# Patient Record
Sex: FEMALE | Race: WHITE
[De-identification: names, ages, dates, MRNs, and addresses within clinical notes are randomized per-mention and may not be internally consistent; named-entity substitution may affect disease eponyms.]

---

## 2019-12-19 ENCOUNTER — HOSPITAL ENCOUNTER (EMERGENCY)
Dept: HOSPITAL 75 - ER FS | Age: 64
Discharge: HOME | End: 2019-12-19
Payer: MEDICARE

## 2019-12-19 VITALS — SYSTOLIC BLOOD PRESSURE: 137 MMHG | DIASTOLIC BLOOD PRESSURE: 68 MMHG

## 2019-12-19 VITALS — HEIGHT: 60 IN | BODY MASS INDEX: 28.57 KG/M2 | WEIGHT: 145.51 LBS

## 2019-12-19 DIAGNOSIS — Z88.8: ICD-10-CM

## 2019-12-19 DIAGNOSIS — B34.9: Primary | ICD-10-CM

## 2019-12-19 DIAGNOSIS — Z88.5: ICD-10-CM

## 2019-12-19 DIAGNOSIS — M79.7: ICD-10-CM

## 2019-12-19 DIAGNOSIS — Z88.0: ICD-10-CM

## 2019-12-19 PROCEDURE — 87804 INFLUENZA ASSAY W/OPTIC: CPT

## 2019-12-19 PROCEDURE — 70450 CT HEAD/BRAIN W/O DYE: CPT

## 2019-12-19 PROCEDURE — 72125 CT NECK SPINE W/O DYE: CPT

## 2019-12-19 NOTE — ED GENERAL
General


Chief Complaint:  Cough/Cold/Flu Symptoms


Stated Complaint:  COUGH,FEVER,FALL-HIT HEAD





History of Present Illness


Date Seen by Provider:  Dec 19, 2019


Time Seen by Provider:  17:39


Initial Comments


Patient is a 64-year-old female with past medical history significant for 

fibromyalgia who comes to the ER today complaining of flu like symptoms. Her 

 has been ill in the house as well. She has had symptoms that started 

yesterday and worsened today. She complains of some upper airway congestion, p

ersistent nonproductive cough, general myalgias, and malaise. Patient states she

did fall on her bathtub last night striking the right side of her head on the 

water spigot. She did not lose consciousness. No blurred vision, nausea or 

vomiting today or since the accident. She does not complain of cervical neck 

tenderness.





Allergies and Home Medications


Allergies


Coded Allergies:  


     Penicillins (Verified  Allergy, Unknown, 12/19/19)


     escitalopram (Verified  Allergy, Unknown, 12/19/19)


     morphine (Verified  Allergy, Unknown, 12/19/19)





Home Medications


Benzonatate 100 Mg Capsule, 200 MG PO TID PRN for cough


   Prescribed by: VINCENT SPARKS on 12/19/19 1838


Prednisone 50 Mg Tab, 50 MG PO DAILY


   Prescribed by: VINCENT SPARKS on 12/19/19 1838





Patient Home Medication List


Home Medication List Reviewed:  Yes





Review of Systems


Review of Systems


Constitutional:  see HPI


EENTM:  see HPI


Respiratory:  no symptoms reported


Gastrointestinal:  no symptoms reported


Musculoskeletal:  muscle pain


Skin:  no symptoms reported


Psychiatric/Neurological:  No Symptoms Reported





All Other Systems Reviewed


Negative Unless Noted:  Yes





Past Medical-Social-Family Hx


Patient Social History


Recent Foreign Travel:  No


Contact w/Someone Who Travel:  No





Physical Exam


Vital Signs





Vital Signs - First Documented








 12/19/19





 17:20


 


Temp 36.9


 


Pulse 78


 


Resp 18


 


B/P (MAP) 137/68 (91)


 


Pulse Ox 97


 


O2 Delivery Room Air





Capillary Refill :


Height, Weight, BMI


Height: '"


Weight: lbs. oz. kg;  BMI


Method:


General Appearance:  No Apparent Distress, WD/WN


HEENT:  PERRL/EOMI, TMs Normal, Normal ENT Inspection, Pharynx Normal


Neck:  Full Range of Motion, Normal Inspection, Non Tender, Supple


Respiratory:  Chest Non Tender, Lungs Clear, Normal Breath Sounds, Other (wheezy

cough during the interview but lungs are otherwise clear with good air mvt 

bilaterally)


Cardiovascular:  Regular Rate, Rhythm, No Edema


Extremity:  Normal Capillary Refill


Neurologic/Psychiatric:  Alert, Oriented x3


Skin:  Normal Color, Warm/Dry





Progress/Results/Core Measures


Suspected Sepsis


SIRS


Temperature: 


Pulse:  


Respiratory Rate: 


 


Blood Pressure  / 


Mean:





Results/Orders


Micro Results





Microbiology


12/19/19 Influenza Types A,B Antigen (CHRISTINA) - Final, Complete


           





My Orders





Orders - VINCENT SPARKS DO


Influenza A And B Antigens (12/19/19 17:22)


Ct Head/Cervical Spine Wo (12/19/19 17:23)


Prednisone Tablet (Deltasone Tablet) (12/19/19 18:45)


Benzonatate Capsule (Tessalon Perles) (12/19/19 18:45)





Vital Signs/I&O











 12/19/19 12/19/19





 17:20 17:20


 


Temp  36.9


 


Pulse  78


 


Resp  18


 


B/P (MAP)  137/68 (91)


 


Pulse Ox  97


 


O2 Delivery Room Air Room Air





Capillary Refill :


Progress Note :  


   Time:  17:42


Progress Note


Patient is evaluated immediately on arrival to her room. She is in no distress 

and is drinking by mouth fluids. Her physical exam is largely unremarkable 

including her HEENT exam other than some ecchymosis is present behind the right 

ear near the mastoid process where the patient states she struck her head last 

evening. She is noted to have a cough during the interview that sounds very 

wheezy but no adventitious lung sounds are present and she has good air movement

in all fields. She sustained a fall last night in her bathtub. She does complain

of dull pain in the right hip. This joint is passively ranged and the patient 

has no pain. Direct compression on the hip joint does not produce pain. No 

indication for imaging. Tonight, will do a flu swab and CT scan of the head 

given that she has a traumatic injury. At baseline, this patient takes 3 hy

drocodone daily for control of her chronic fibromyalgia pain.





18:44:  All results are reviewed and discussed with the patient. CT scan does 

not reveal any acute findings. Influenza screen is negative. I diagnosed the 

patient with viral syndrome. I gave her some prednisone hopefully to help with 

her wheezy cough as well as some Tessalon Perles. Recommended that she follow up

with her primary care doctor. Return to the ER for any new or worsening 

symptoms.





Departure


Impression





   Primary Impression:  


   Viral syndrome


Disposition:  01 HOME, SELF-CARE


Condition:  Improved





Departure-Patient Inst.


Referrals:  


CAROLYN SOLIMAN MD (PCP/Family)


Primary Care Physician


Scripts


Benzonatate (TESSALON PERLES) 100 Mg Capsule


200 MG PO TID PRN for cough, #21 CAP


   Prov: VINCENT SPAKRS DO         12/19/19 


Prednisone (Prednisone) 50 Mg Tab


50 MG PO DAILY for 4 Days, #4 TAB


   Prov: VINCENT SPARKS DO         12/19/19











VINCENT SPARKS DO             Dec 19, 2019 17:44

## 2019-12-19 NOTE — DIAGNOSTIC IMAGING REPORT
PROCEDURE: CT head and CT cervical spine without contrast.



TECHNIQUE: Multiple contiguous axial images were obtained through

the brain and cervical spine without the use of intravenous

contrast. Sagittal and coronal reformations through the cervical

spine were then performed. Auto Exposure Controls were utilized

during the CT exam to meet ALARA standards for radiation dose

reduction. 



INDICATION: Fall, head and neck injury, bruising on the right

side of the neck at the base of the skull.



COMPARISON: None.



FINDINGS:



CT HEAD: Ventricles and cortical sulci appear age appropriate.

There is no midline shift or mass effect. No acute intracranial

hemorrhage is seen. There is no CT evidence of acute territorial

ischemia. The calvarium appears intact.



CT CERVICAL SPINE: There are mild degenerative changes at C5-C6.

There is mild multilevel facet arthropathy. No acute fracture is

seen. The soft tissues about the cervical spine demonstrate edema

at the right posterior neck. No bony fragments or hyperdense

fluid collections are seen in the spinal canal.



IMPRESSION:

1. No acute intracranial hemorrhage or calvarium fracture.

2. No acute fracture is seen in the cervical spine.

3. Superficial soft tissue edema in the right posterior upper

neck.



Dictated by: 



  Dictated on workstation # [id number]

## 2020-12-29 ENCOUNTER — HOSPITAL ENCOUNTER (EMERGENCY)
Dept: HOSPITAL 75 - ER FS | Age: 65
Discharge: HOME | End: 2020-12-29
Payer: MEDICARE

## 2020-12-29 VITALS — SYSTOLIC BLOOD PRESSURE: 124 MMHG | DIASTOLIC BLOOD PRESSURE: 62 MMHG

## 2020-12-29 VITALS — HEIGHT: 62.99 IN | WEIGHT: 136.69 LBS | BODY MASS INDEX: 24.22 KG/M2

## 2020-12-29 DIAGNOSIS — Z88.8: ICD-10-CM

## 2020-12-29 DIAGNOSIS — B34.9: Primary | ICD-10-CM

## 2020-12-29 DIAGNOSIS — Z88.0: ICD-10-CM

## 2020-12-29 DIAGNOSIS — F41.9: ICD-10-CM

## 2020-12-29 DIAGNOSIS — Z79.52: ICD-10-CM

## 2020-12-29 DIAGNOSIS — Z88.5: ICD-10-CM

## 2020-12-29 DIAGNOSIS — N30.00: ICD-10-CM

## 2020-12-29 LAB
ALBUMIN SERPL-MCNC: 4.3 GM/DL (ref 3.2–4.5)
ALP SERPL-CCNC: 139 U/L (ref 40–136)
ALT SERPL-CCNC: 11 U/L (ref 0–55)
APTT PPP: YELLOW S
BACTERIA #/AREA URNS HPF: (no result) /HPF
BASOPHILS # BLD AUTO: 0.1 10^3/UL (ref 0–0.1)
BASOPHILS NFR BLD AUTO: 1 % (ref 0–10)
BILIRUB SERPL-MCNC: 0.2 MG/DL (ref 0.1–1)
BILIRUB UR QL STRIP: NEGATIVE
BUN/CREAT SERPL: 13
CALCIUM SERPL-MCNC: 9.5 MG/DL (ref 8.5–10.1)
CHLORIDE SERPL-SCNC: 113 MMOL/L (ref 98–107)
CO2 SERPL-SCNC: 21 MMOL/L (ref 21–32)
CREAT SERPL-MCNC: 1.12 MG/DL (ref 0.6–1.3)
EOSINOPHIL # BLD AUTO: 0.2 10^3/UL (ref 0–0.3)
EOSINOPHIL NFR BLD AUTO: 3 % (ref 0–10)
FIBRINOGEN PPP-MCNC: CLEAR MG/DL
GFR SERPLBLD BASED ON 1.73 SQ M-ARVRAT: 49 ML/MIN
GLUCOSE SERPL-MCNC: 97 MG/DL (ref 70–105)
GLUCOSE UR STRIP-MCNC: NEGATIVE MG/DL
HCT VFR BLD CALC: 38 % (ref 35–52)
HGB BLD-MCNC: 12.3 G/DL (ref 11.5–16)
KETONES UR QL STRIP: NEGATIVE
LEUKOCYTE ESTERASE UR QL STRIP: (no result)
LYMPHOCYTES # BLD AUTO: 2.6 X 10^3 (ref 1–4)
LYMPHOCYTES NFR BLD AUTO: 39 % (ref 12–44)
MANUAL DIFFERENTIAL PERFORMED BLD QL: NO
MCH RBC QN AUTO: 31 PG (ref 25–34)
MCHC RBC AUTO-ENTMCNC: 33 G/DL (ref 32–36)
MCV RBC AUTO: 96 FL (ref 80–99)
MONOCYTES # BLD AUTO: 0.3 X 10^3 (ref 0–1)
MONOCYTES NFR BLD AUTO: 5 % (ref 0–12)
NEUTROPHILS # BLD AUTO: 3.4 X 10^3 (ref 1.8–7.8)
NEUTROPHILS NFR BLD AUTO: 51 % (ref 42–75)
NITRITE UR QL STRIP: NEGATIVE
PH UR STRIP: 6.5 [PH] (ref 5–9)
PLATELET # BLD: 456 10^3/UL (ref 130–400)
PMV BLD AUTO: 8.9 FL (ref 7.4–10.4)
POTASSIUM SERPL-SCNC: 4.1 MMOL/L (ref 3.6–5)
PROT SERPL-MCNC: 7 GM/DL (ref 6.4–8.2)
PROT UR QL STRIP: NEGATIVE
RBC #/AREA URNS HPF: (no result) /HPF
SODIUM SERPL-SCNC: 144 MMOL/L (ref 135–145)
SP GR UR STRIP: 1.02 (ref 1.02–1.02)
SQUAMOUS #/AREA URNS HPF: (no result) /HPF
WBC # BLD AUTO: 6.7 10^3/UL (ref 4.3–11)
WBC #/AREA URNS HPF: (no result) /HPF

## 2020-12-29 PROCEDURE — 87804 INFLUENZA ASSAY W/OPTIC: CPT

## 2020-12-29 PROCEDURE — 80053 COMPREHEN METABOLIC PANEL: CPT

## 2020-12-29 PROCEDURE — 86141 C-REACTIVE PROTEIN HS: CPT

## 2020-12-29 PROCEDURE — 81000 URINALYSIS NONAUTO W/SCOPE: CPT

## 2020-12-29 PROCEDURE — 71046 X-RAY EXAM CHEST 2 VIEWS: CPT

## 2020-12-29 PROCEDURE — 85025 COMPLETE CBC W/AUTO DIFF WBC: CPT

## 2020-12-29 PROCEDURE — 87088 URINE BACTERIA CULTURE: CPT

## 2020-12-29 PROCEDURE — 36415 COLL VENOUS BLD VENIPUNCTURE: CPT

## 2020-12-29 NOTE — NUR
PT COMPLAINED ABOUT BEING SENT HERE TO THE ER OVER THE PHONE FROM [facility] FROM THE START OF HER VISIT.  SHE ALSO REPORTED SHE "HAD NO IDEA WE WERE 
NOT THE SAME AS URGENT CARE OR ELSE SHE WOULDNT HAVE CAME TO ANOTHER PLACE". 
SHE COMPLAINED ABOUT THE BLOOD DRAW AND SCREAMED DURING THE DRAW.  SHE REPORTS 
SHE STILL HAS PNEUMONIA AND NEEDS A STRONGER ANTIBIOTIC BC THE STRONGEST 
ANTIBIOTIC UC COULD GIVE HER ON EULALIA DAY WAS ARITHROMYCIN AND SHE STATES 
THAT ITS NOT WORKING.  SHE ALSO STATES HER TEMPERATURE IS ALWAYS 97.4 AND THAT 
ANYTHING OTHER THAN THAT IS A FEVER FOR HER AND THAT HER TEMPERATURE IS IN THE 
"RED ZONE" EVERY MORNING.  SHE HAS HAD 3 MEGATIVE COVID TESTS RECENTLY.

-------------------------------------------------------------------------------

Addendum: 12/29/20 at 1717 by YAYA

-------------------------------------------------------------------------------

MARLENE

*NEGATIVE

SPELLING ERRORS CORRECTED

## 2020-12-29 NOTE — ED GENERAL
General


Chief Complaint:  General Problems/Pain


Stated Complaint:  PNEUMONIA,THROAT PAIN


Source of Information:  Patient


Exam Limitations:  No Limitations





History of Present Illness


Date Seen by Provider:  Dec 29, 2020


Time Seen by Provider:  16:41


Initial Comments


65-year-old female comes in for recheck of pneumonia, sore throat. Patient r

eports that on Cecy Day she was diagnosed with a right ear infection and 

pneumonia started on azithromycin. Patient comes in today because she feels like

she is not any better. Patient complains of just some body aches and that in the

morning when she gets up she has a fever. Patient called her primary care 

provider who told her to come to the ER. Upon arrival to the ER patient's 

agitated, oxygen saturations are 100%. There is no acute signs of distress. She 

is afebrile. She took her last azithromycin today.





Allergies and Home Medications


Allergies


Coded Allergies:  


     Penicillins (Verified  Allergy, Unknown, 19)


     escitalopram (Verified  Allergy, Unknown, 19)


     morphine (Verified  Allergy, Unknown, 19)





Home Medications


Benzonatate 100 Mg Capsule, 200 MG PO TID PRN for cough


   Prescribed by: VINCENT SPARKS on 19


Prednisone 50 Mg Tab, 50 MG PO DAILY


   Prescribed by: VINCENT SPARKS on 19





Patient Home Medication List


Home Medication List Reviewed:  Yes





Review of Systems


Review of Systems


Constitutional:  see HPI, malaise


EENTM:  see HPI


Respiratory:  cough


Cardiovascular:  No chest pain, No palpitations


Gastrointestinal:  No diarrhea, No nausea, No vomiting


Musculoskeletal:  no symptoms reported


Skin:  no symptoms reported


Psychiatric/Neurological:  No Symptoms Reported


Hematologic/Lymphatic:  No Symptoms Reported


Immunological/Allergic:  no symptoms reported





Past Medical-Social-Family Hx


Past Med/Social Hx:  Reviewed Nursing Past Med/Soc Hx


Patient Social History


2nd Hand Smoke Exposure:  No


Recent Foreign Travel:  No


Contact w/Someone Who Travel:  No


Recent Hopitalizations:  No





Seasonal Allergies


Seasonal Allergies:  Yes





Past Medical History


Adenoidectomy, Breast, Eye Surgery, Hysterectomy, Tonsillectomy


Respiratory:  No


Cardiac:  No


Neurological:  Yes


Headaches /Migraines


GYN History:  Hysterectomy


Genitourinary:  No


Gastrointestinal:  Yes


Irritable Bowel


Musculoskeletal:  Yes


Degenerate Disk Disease, Arthritis, Fibromyalgia, Back Injury, Chronic Back Pain


Endocrine:  No


HEENT:  Yes


Cataract


Loss of Vision:  Denies


Hearing Impairment:  Denies


Cancer:  No


Psychosocial:  Yes


Anxiety, Depression


Integumentary:  No


Blood Disorders:  No





Physical Exam


Vital Signs





Vital Signs - First Documented








 20





 16:59


 


Temp 36.5


 


Pulse 86


 


Resp 18


 


B/P (MAP) 135/77 (96)


 


Pulse Ox 99


 


O2 Delivery Room Air





Capillary Refill :


Height, Weight, BMI


Height: '"


Weight: lbs. oz. kg; 28.00 BMI


Method:


General Appearance:  No Apparent Distress, Anxious


HEENT:  PERRL/EOMI, TMs Normal


Neck:  Non Tender, Supple


Respiratory:  Lungs Clear, Normal Breath Sounds, No Accessory Muscle Use, No 

Respiratory Distress


Cardiovascular:  No Edema


Gastrointestinal:  Non Tender, Soft


Extremity:  Non Tender, No Calf Tenderness


Neurologic/Psychiatric:  Alert, Oriented x3, No Motor/Sensory Deficits, Normal 

Mood/Affect, CNs II-XII Norm as Tested


Skin:  Normal Color, Warm/Dry





Progress/Results/Core Measures


Suspected Sepsis


SIRS


Temperature: 


Pulse:  


Respiratory Rate: 


 


Laboratory Tests


20 16:50: White Blood Count 6.7


Blood Pressure  / 


Mean: 


 





Laboratory Tests


20 16:50: 


Creatinine 1.12, Platelet Count 456H, Total Bilirubin 0.2








Results/Orders


Lab Results





Laboratory Tests








Test


 20


16:50 20


17:10 Range/Units


 


 


White Blood Count


 6.7 


 


 4.3-11.0


10^3/uL


 


Red Blood Count


 3.92 L


 


 4.35-5.85


10^6/uL


 


Hemoglobin 12.3   11.5-16.0  G/DL


 


Hematocrit 38   35-52  %


 


Mean Corpuscular Volume 96   80-99  FL


 


Mean Corpuscular Hemoglobin 31   25-34  PG


 


Mean Corpuscular Hemoglobin


Concent 33 


 


 32-36  G/DL





 


Red Cell Distribution Width 13.8   10.0-14.5  %


 


Platelet Count


 456 H


 


 130-400


10^3/uL


 


Mean Platelet Volume 8.9   7.4-10.4  FL


 


Immature Granulocyte % (Auto) 0    %


 


Neutrophils (%) (Auto) 51   42-75  %


 


Lymphocytes (%) (Auto) 39   12-44  %


 


Monocytes (%) (Auto) 5   0-12  %


 


Eosinophils (%) (Auto) 3   0-10  %


 


Basophils (%) (Auto) 1   0-10  %


 


Neutrophils # (Auto) 3.4   1.8-7.8  X 10^3


 


Lymphocytes # (Auto) 2.6   1.0-4.0  X 10^3


 


Monocytes # (Auto) 0.3   0.0-1.0  X 10^3


 


Eosinophils # (Auto)


 0.2 


 


 0.0-0.3


10^3/uL


 


Basophils # (Auto)


 0.1 


 


 0.0-0.1


10^3/uL


 


Immature Granulocyte # (Auto)


 0.0 


 


 0.0-0.1


10^3/uL


 


Sodium Level 144   135-145  MMOL/L


 


Potassium Level 4.1   3.6-5.0  MMOL/L


 


Chloride Level 113 H    MMOL/L


 


Carbon Dioxide Level 21   21-32  MMOL/L


 


Anion Gap 10   5-14  MMOL/L


 


Blood Urea Nitrogen 15   7-18  MG/DL


 


Creatinine


 1.12 


 


 0.60-1.30


MG/DL


 


Estimat Glomerular Filtration


Rate 49 


 


  





 


BUN/Creatinine Ratio 13    


 


Glucose Level 97     MG/DL


 


Calcium Level 9.5   8.5-10.1  MG/DL


 


Corrected Calcium 9.3   8.5-10.1  MG/DL


 


Total Bilirubin 0.2   0.1-1.0  MG/DL


 


Aspartate Amino Transf


(AST/SGOT) 15 


 


 5-34  U/L





 


Alanine Aminotransferase


(ALT/SGPT) 11 


 


 0-55  U/L





 


Alkaline Phosphatase 139 H    U/L


 


C-Reactive Protein 0.65 H  <0.50  MG/DL


 


Total Protein 7.0   6.4-8.2  GM/DL


 


Albumin 4.3   3.2-4.5  GM/DL


 


Urine Color  YELLOW   


 


Urine Clarity  CLEAR   


 


Urine pH  6.5  5-9  


 


Urine Specific Gravity  1.025 H 1.016-1.022  


 


Urine Protein  NEGATIVE  NEGATIVE  


 


Urine Glucose (UA)  NEGATIVE  NEGATIVE  


 


Urine Ketones  NEGATIVE  NEGATIVE  


 


Urine Nitrite  NEGATIVE  NEGATIVE  


 


Urine Bilirubin  NEGATIVE  NEGATIVE  


 


Urine Urobilinogen  1.0  < = 1.0  MG/DL


 


Urine Leukocyte Esterase  TRACE H NEGATIVE  


 


Urine RBC (Auto)  NEGATIVE  NEGATIVE  


 


Urine RBC  NONE   /HPF


 


Urine WBC  5-10 H  /HPF


 


Urine Squamous Epithelial


Cells 


 0-2 


  /HPF





 


Urine Crystals  NONE   /LPF


 


Urine Bacteria  TRACE   /HPF


 


Urine Casts  NONE   /LPF


 


Urine Mucus  SMALL H  /LPF


 


Urine Culture Indicated  YES   








My Orders





Orders - ALE PELAEZ L DO


Cbc With Automated Diff (20 16:42)


Comprehensive Metabolic Panel (20 16:42)


Ua Culture If Indicated (20 16:42)


Influenza A And B Antigens (20 16:42)


Crp Fs (20 16:42)


Chest Pa/Lat (2 View) (20 16:42)


Urine Culture (20 17:10)





Vital Signs/I&O











 20





 16:59


 


Temp 36.5


 


Pulse 86


 


Resp 18


 


B/P (MAP) 135/77 (96)


 


Pulse Ox 99


 


O2 Delivery Room Air





Capillary Refill :


Progress Note :  


   Time:  17:33


Progress Note


Patient with normal chest x-ray no pneumonia. Patient with labs that are 

unremarkable except for a questionable urinary tract infection. Based on her 

still complaining of symptoms and body aches I will start her on Macrobid. 

Patient stable and will be discharged home





Diagnostic Imaging





   Diagonstic Imaging:  Xray


   Plain Films/CT/US/NM/MRI:  chest


Comments


                 [facility] [place], [place]





NAME:   CELESTINA FORBES


[facility] REC#:   [id number]


ACCOUNT#:   [id number]


PT STATUS:   REG ER


:   1955


PHYSICIAN:   ALE PELAEZ DO


ADMIT DATE:   20/ER FS


                                   ***Draft***


Date of Exam:20





CHEST PA/LAT (2 VIEW)








EXAM: CHEST PA/LAT (2 VIEW)





INDICATION: Cough.





COMPARISON: None.





FINDINGS: Normal heart size and central pulmonary vascularity.


Mild linear atelectasis or scarring in the right upper lobe. No


focal pulmonary consolidation. No pleural effusion or


pneumothorax. No acute osseous findings. Demineralization.





IMPRESSION: No acute cardiopulmonary findings.





  Dictated on workstation # [id number]








Dict:   20


Trans:   20 1711


[id number]





Interpreted by:     LINSEY CERDA MD


Electronically signed by:





Departure


Impression





   Primary Impression:  


   Post viral syndrome


   Additional Impression:  


   Acute cystitis


   Qualified Codes:  N30.00 - Acute cystitis without hematuria


Disposition:  01 HOME, SELF-CARE


Condition:  Stable





Departure-Patient Inst.


Referrals:  


CAROLYN SOLIMAN MD (PCP/Family)


Primary Care Physician


Patient Instructions:  VIRAL SYNDROME





Add. Discharge Instructions:  


Follow-up with your primary care provider in 3-4 days for continuation of care 

and recheck in today symptoms





All discharge instructions reviewed with patient and/or family. Voiced 

understanding.


Scripts


Nitrofurantoin Macrocrystal (Nitrofurantoin) 100 Mg Capsule


100 MG PO BID, #14 CAP 0 Refills


   Prov: ALE PELAEZ DO         20











ALE PELAEZ DO               Dec 29, 2020 16:41

## 2020-12-29 NOTE — DIAGNOSTIC IMAGING REPORT
EXAM: CHEST PA/LAT (2 VIEW)



INDICATION: Cough.



COMPARISON: None.



FINDINGS: Normal heart size and central pulmonary vascularity.

Mild linear atelectasis or scarring in the right upper lobe. No

focal pulmonary consolidation. No pleural effusion or

pneumothorax. No acute osseous findings. Demineralization.



IMPRESSION: No acute cardiopulmonary findings.



Dictated by: 



  Dictated on workstation # [id number]

## 2021-03-22 ENCOUNTER — HOSPITAL ENCOUNTER (OUTPATIENT)
Dept: HOSPITAL 75 - PREOP | Age: 66
Discharge: HOME | End: 2021-03-22
Attending: SURGERY
Payer: MEDICARE

## 2021-03-22 VITALS — HEIGHT: 60 IN | WEIGHT: 130.07 LBS | BODY MASS INDEX: 25.54 KG/M2

## 2021-03-22 DIAGNOSIS — Z01.818: Primary | ICD-10-CM

## 2021-03-29 ENCOUNTER — HOSPITAL ENCOUNTER (OUTPATIENT)
Dept: HOSPITAL 75 - ENDO | Age: 66
Discharge: HOME | End: 2021-03-29
Attending: SURGERY
Payer: MEDICARE

## 2021-03-29 VITALS — SYSTOLIC BLOOD PRESSURE: 115 MMHG | DIASTOLIC BLOOD PRESSURE: 60 MMHG

## 2021-03-29 VITALS — SYSTOLIC BLOOD PRESSURE: 118 MMHG | DIASTOLIC BLOOD PRESSURE: 65 MMHG

## 2021-03-29 VITALS — DIASTOLIC BLOOD PRESSURE: 57 MMHG | SYSTOLIC BLOOD PRESSURE: 113 MMHG

## 2021-03-29 VITALS — SYSTOLIC BLOOD PRESSURE: 136 MMHG | DIASTOLIC BLOOD PRESSURE: 65 MMHG

## 2021-03-29 VITALS — HEIGHT: 60 IN | WEIGHT: 130.07 LBS | BODY MASS INDEX: 25.54 KG/M2

## 2021-03-29 VITALS — DIASTOLIC BLOOD PRESSURE: 65 MMHG | SYSTOLIC BLOOD PRESSURE: 115 MMHG

## 2021-03-29 DIAGNOSIS — Z88.0: ICD-10-CM

## 2021-03-29 DIAGNOSIS — Z80.9: ICD-10-CM

## 2021-03-29 DIAGNOSIS — Z86.010: ICD-10-CM

## 2021-03-29 DIAGNOSIS — K64.8: ICD-10-CM

## 2021-03-29 DIAGNOSIS — Z79.899: ICD-10-CM

## 2021-03-29 DIAGNOSIS — K29.70: ICD-10-CM

## 2021-03-29 DIAGNOSIS — K57.30: ICD-10-CM

## 2021-03-29 DIAGNOSIS — D12.3: Primary | ICD-10-CM

## 2021-03-29 DIAGNOSIS — I10: ICD-10-CM

## 2021-03-29 DIAGNOSIS — K44.9: ICD-10-CM

## 2021-03-29 DIAGNOSIS — Z90.710: ICD-10-CM

## 2021-03-29 DIAGNOSIS — Z88.5: ICD-10-CM

## 2021-03-29 DIAGNOSIS — F41.9: ICD-10-CM

## 2021-03-29 DIAGNOSIS — Z88.8: ICD-10-CM

## 2021-03-29 DIAGNOSIS — J45.909: ICD-10-CM

## 2021-03-29 DIAGNOSIS — F32.9: ICD-10-CM

## 2021-03-29 DIAGNOSIS — K29.50: ICD-10-CM

## 2021-03-29 DIAGNOSIS — K92.1: ICD-10-CM

## 2021-03-29 DIAGNOSIS — K20.90: ICD-10-CM

## 2021-03-29 PROCEDURE — 88305 TISSUE EXAM BY PATHOLOGIST: CPT

## 2021-03-29 NOTE — ANESTHESIA-GENERAL POST-OP
MAC


Patient Condition


Mental Status/LOC:  Same as Preop


Cardiovascular:  Satisfactory


Nausea/Vomiting:  Absent


Respiratory:  Satisfactory


Pain:  Controlled


Complications:  Absent





Post Op Complications


Complications


None





Follow Up Care/Instructions


Patient Instructions


None needed.





Anesthesiology Discharge Order


Discharge Order


Patient is doing well, no complaints, stable vital signs, no apparent adverse 

anesthesia problems.   


No complications reported per nursing.











GIANCARLO VERDE CRNA         Mar 29, 2021 13:25

## 2021-03-29 NOTE — ENDOSCOPY DISCHARGE INSTRUCT
Endo Procedure/Findings


Findings


1.:  Gastritis


2.:  Hiatal Hernia


3.:  Polyp


4.:  Diverticulosis, Internal Hemorrhoids





Discharge Instructions


-


Activity: You might feel a little sleepy until tomorrow.  This is due to the 

medicine you received to relax you.





Until tomorrow, you should:  


   NOT drive a car, operate machinery or power tools.


   NOT drink any alcoholic beverages.


   NOT make any important decisions or sign importortant papers.





Do not return to work until tomorrow, unless otherwise instructed. Resume 

previous activities tomorrow.





Diet: Start by taking liquids.  If you tolerate liquids, advance to solid food.


1.:  EGD in 3 years


2.:  Colonscopy in 5 years





Notify Physician


-


If you experience excessive bleeding, unusual abdominal pain, fever, or chest 

pain, contact your doctor immediately.











LONI DELONG DO               Mar 29, 2021 13:18

## 2021-03-29 NOTE — PROGRESS NOTE-POST OPERATIVE
Post-Operative Progess Note


Surgeon (s)/Assistant (s)


Surgeon


LONI DELONG DO


Assistant:  none





Pre-Operative Diagnosis


Abd pain, Gastritis, hx of polyps, melena





Post-Operative Diagnosis





Gastritis


Hiatal Hernia


Transverse colon polyp


diverticula


int hemorrhoids





Procedure & Operative Findings


Date of Procedure


3/29/21


Procedure Performed/Findings


EGD with bx


Colon with snare


Anesthesia Type


IV sedation by CRNA





Estimated Blood Loss


Estimated blood loss (mL):  scant





Specimens/Packing


Specimens Removed


antral bx


Body of stomach bx


GE jxn bx x 2


transverse colon polyp











LONI DELONG DO               Mar 29, 2021 13:17

## 2021-03-29 NOTE — PROGRESS NOTE-PRE OPERATIVE
Pre-Operative Progress Note


H&P Reviewed


The H&P was reviewed, patient examined and no changes noted.


Time Seen by Provider:  12:16


Date H&P Reviewed:  Mar 29, 2021


Time H&P Reviewed:  12:17


Pre-Operative Diagnosis:  Abd pain, Gastritis, hx of polyps, melena











LONI DELONG DO               Mar 29, 2021 12:17

## 2021-03-30 NOTE — OPERATIVE REPORT
DATE OF SERVICE:  03/29/2021



PREOPERATIVE DIAGNOSES:

Gastritis, history of polyps and melena.



POSTOPERATIVE DIAGNOSES:

Gastritis, hiatal hernia, colon polyps, diverticula, internal hemorrhoids.



PROCEDURES:

1.  EGD with biopsy.

2.  Colonoscopy with snare polypectomy.



SURGEON:

Javier Orr DO



FIRST ASSISTANT:

None.



ANESTHESIA:

IV sedation by the CRNA.



SPECIMEN:

Biopsy of the antrum, biopsy of the GE junction x2, biopsy of body of stomach

and biopsy from the hiatal hernia as well as a transverse colon polyp.



BLOOD LOSS:

Scant.



FLUIDS:

Per anesthesia.



POSTOPERATIVE CONDITION:

Stable.



INDICATION FOR PROCEDURE:

The patient is a 66-year-old female who has been having some melena, abdominal

pain and burning, gastritis and history of polyps, needed a workup.



FINDINGS:

The patient had gastritis and a large hiatal hernia.  She also had a polyp in

the colon, diverticula and internal hemorrhoids.



PROCEDURE NOTE:

After informed consent was obtained, the patient was brought to the endoscopy

suite, placed in bed in left lateral decubitus position.  She was administered

IV sedation by the CRNA who then monitored her vitals the entire time, heart

rate, blood pressure and pulse ox and the scope was inserted, started with the

EGD, placing scope down the mouth through the esophagus into the stomach, noted

some inflammation at the antrum, pushing the duodenum.  Duodenum looked okay,

took a picture.  Pulled back, did a biopsy of the antrum and then pulled into

the GE junction, did two biopsies.  Pushed back into the stomach, did a biopsy

of the body of stomach and then did another biopsy of the hiatal hernia,

suctioned all the air out of stomach and then pulled the scope up the esophagus

and out the mouth, did not see any problems in the esophagus.  Switched camera,

switched gloves, went down below, started the colonoscopy, pushed in, on the way

in, noted some diverticula, took a picture of this and then in the transverse

colon, saw a polyp, did snare polypectomy, continued to cecum, took a picture of

appendiceal orifice, noted the ileocecal valve and then slowly withdrew the

scope insufflating to look circumferentially at the walls looking the cecum, up

the ascending colon to the hepatic flexure, then down the transverse colon,

splenic flexure, into the descending colon down in the sigmoid and finally into

the rectum, retroflexed in the rectal vault, saw some minimal internal

hemorrhoids, and took a picture of this.  The patient tolerated the procedure,

recovered in the endoscopy suite.





Job ID: [id number]

DocumentID: [id number]

Dictated Date:  03/30/2021 17:22:08

Transcription Date: 03/30/2021 21:23:04

Dictated By: JAVIER ORR DO

## 2021-07-22 ENCOUNTER — HOSPITAL ENCOUNTER (EMERGENCY)
Dept: HOSPITAL 75 - ER FS | Age: 66
Discharge: HOME | End: 2021-07-22
Payer: MEDICARE

## 2021-07-22 VITALS — WEIGHT: 132.28 LBS | BODY MASS INDEX: 25.97 KG/M2 | HEIGHT: 59.84 IN

## 2021-07-22 VITALS — DIASTOLIC BLOOD PRESSURE: 64 MMHG | SYSTOLIC BLOOD PRESSURE: 101 MMHG

## 2021-07-22 DIAGNOSIS — F32.9: ICD-10-CM

## 2021-07-22 DIAGNOSIS — Z79.899: ICD-10-CM

## 2021-07-22 DIAGNOSIS — J45.909: ICD-10-CM

## 2021-07-22 DIAGNOSIS — Z20.822: ICD-10-CM

## 2021-07-22 DIAGNOSIS — I10: ICD-10-CM

## 2021-07-22 DIAGNOSIS — I95.9: ICD-10-CM

## 2021-07-22 DIAGNOSIS — N39.0: Primary | ICD-10-CM

## 2021-07-22 DIAGNOSIS — F41.9: ICD-10-CM

## 2021-07-22 DIAGNOSIS — R53.1: ICD-10-CM

## 2021-07-22 LAB
ALBUMIN SERPL-MCNC: 3.3 GM/DL (ref 3.2–4.5)
ALP SERPL-CCNC: 112 U/L (ref 40–136)
ALT SERPL-CCNC: 9 U/L (ref 0–55)
APTT PPP: YELLOW S
BACTERIA #/AREA URNS HPF: (no result) /HPF
BARBITURATES UR QL: NEGATIVE
BASOPHILS # BLD AUTO: 0 10^3/UL (ref 0–0.1)
BASOPHILS NFR BLD AUTO: 0 % (ref 0–10)
BENZODIAZ UR QL SCN: POSITIVE
BILIRUB SERPL-MCNC: 0.4 MG/DL (ref 0.1–1)
BILIRUB UR QL STRIP: NEGATIVE
BUN/CREAT SERPL: 12
CALCIUM SERPL-MCNC: 7.4 MG/DL (ref 8.5–10.1)
CHLORIDE SERPL-SCNC: 112 MMOL/L (ref 98–107)
CO2 SERPL-SCNC: 17 MMOL/L (ref 21–32)
COCAINE UR QL: NEGATIVE
CREAT SERPL-MCNC: 1.09 MG/DL (ref 0.6–1.3)
EOSINOPHIL # BLD AUTO: 0 10^3/UL (ref 0–0.3)
EOSINOPHIL NFR BLD AUTO: 0 % (ref 0–10)
FIBRINOGEN PPP-MCNC: (no result) MG/DL
GFR SERPLBLD BASED ON 1.73 SQ M-ARVRAT: 50 ML/MIN
GLUCOSE SERPL-MCNC: 90 MG/DL (ref 70–105)
GLUCOSE UR STRIP-MCNC: NEGATIVE MG/DL
HCT VFR BLD CALC: 35 % (ref 35–52)
HGB BLD-MCNC: 11.7 G/DL (ref 11.5–16)
KETONES UR QL STRIP: NEGATIVE
LEUKOCYTE ESTERASE UR QL STRIP: NEGATIVE
LYMPHOCYTES # BLD AUTO: 1.2 X 10^3 (ref 1–4)
LYMPHOCYTES NFR BLD AUTO: 8 % (ref 12–44)
MAGNESIUM SERPL-MCNC: 1.6 MG/DL (ref 1.6–2.4)
MANUAL DIFFERENTIAL PERFORMED BLD QL: YES
MCH RBC QN AUTO: 33 PG (ref 25–34)
MCHC RBC AUTO-ENTMCNC: 34 G/DL (ref 32–36)
MCV RBC AUTO: 98 FL (ref 80–99)
METHADONE UR QL SCN: NEGATIVE
METHAMPHETAMINE SCREEN URINE S: NEGATIVE
MONOCYTES # BLD AUTO: 0.5 X 10^3 (ref 0–1)
MONOCYTES NFR BLD AUTO: 3 % (ref 0–12)
MONOCYTES NFR BLD: 5 %
NEUTROPHILS # BLD AUTO: 13.9 X 10^3 (ref 1.8–7.8)
NEUTROPHILS NFR BLD AUTO: 88 % (ref 42–75)
NEUTS BAND NFR BLD MANUAL: 74 %
NEUTS BAND NFR BLD: 15 %
NITRITE UR QL STRIP: NEGATIVE
OPIATES UR QL SCN: POSITIVE
OXYCODONE UR QL: NEGATIVE
PH UR STRIP: 5.5 [PH] (ref 5–9)
PLATELET # BLD: 254 10^3/UL (ref 130–400)
PMV BLD AUTO: 10.2 FL (ref 7.4–10.4)
POTASSIUM SERPL-SCNC: 4.1 MMOL/L (ref 3.6–5)
PROPOXYPH UR QL: NEGATIVE
PROT SERPL-MCNC: 5.7 GM/DL (ref 6.4–8.2)
PROT UR QL STRIP: NEGATIVE
RBC #/AREA URNS HPF: (no result) /HPF
RBC MORPH BLD: NORMAL
SODIUM SERPL-SCNC: 141 MMOL/L (ref 135–145)
SP GR UR STRIP: 1.02 (ref 1.02–1.02)
SQUAMOUS #/AREA URNS HPF: >50 /HPF
TRICYCLICS UR QL SCN: NEGATIVE
VARIANT LYMPHS NFR BLD MANUAL: 6 %
WBC # BLD AUTO: 15.8 10^3/UL (ref 4.3–11)
WBC #/AREA URNS HPF: (no result) /HPF

## 2021-07-22 PROCEDURE — 80053 COMPREHEN METABOLIC PANEL: CPT

## 2021-07-22 PROCEDURE — 70450 CT HEAD/BRAIN W/O DYE: CPT

## 2021-07-22 PROCEDURE — 87040 BLOOD CULTURE FOR BACTERIA: CPT

## 2021-07-22 PROCEDURE — 86141 C-REACTIVE PROTEIN HS: CPT

## 2021-07-22 PROCEDURE — 81000 URINALYSIS NONAUTO W/SCOPE: CPT

## 2021-07-22 PROCEDURE — 71045 X-RAY EXAM CHEST 1 VIEW: CPT

## 2021-07-22 PROCEDURE — 85027 COMPLETE CBC AUTOMATED: CPT

## 2021-07-22 PROCEDURE — 85007 BL SMEAR W/DIFF WBC COUNT: CPT

## 2021-07-22 PROCEDURE — 80306 DRUG TEST PRSMV INSTRMNT: CPT

## 2021-07-22 PROCEDURE — 83605 ASSAY OF LACTIC ACID: CPT

## 2021-07-22 PROCEDURE — 36415 COLL VENOUS BLD VENIPUNCTURE: CPT

## 2021-07-22 PROCEDURE — 83735 ASSAY OF MAGNESIUM: CPT

## 2021-07-22 PROCEDURE — 96374 THER/PROPH/DIAG INJ IV PUSH: CPT

## 2021-07-22 NOTE — ED GENERAL
General


Stated Complaint:  BODY ACHES;NUMBNESS;SLOW RESPONSE;FEVER


Source of Information:  Patient





History of Present Illness


Date Seen by Provider:  Jul 22, 2021


Time Seen by Provider:  15:40


Initial Comments


Patient is a 66-year-old female who presents with multiple medical complaints.  

Patient reports generalized weakness, body aches and fever 102.1.  She was 

evaluated at the urgent care and had influenza and Covid swabs which were 

reported to be negative.  Fever and symptoms began 12 hours prior to ED arrival.

 Patient states she woke up with chills and sweats and measured temperature at 

home.  She has had a dry nonproductive cough but denies shortness of breath or 

chest pain.  She reports urinary frequency urgency and burning the past 3 days 

with decreased urinary output.  She has chronic neck shoulder and back pain.  

She reports mild headache and took a hydrocodone prior to ED arrival.  Patient 

reports extreme fatigue and exhaustion caring for her  who has had a 

extended recovery at home from hemorrhoid surgery.  She reports left arm 

numbness but denies weakness earlier today.  Numbness is resolved.  Denies 

change in vision, slurred speech, loss of balance, extremity weakness.  History 

of fibromyalgia.  No other acute symptoms or complaints.


Timing/Duration:  12-24 Hours


Severity:  Moderate


Modifying Factors:  improves with Other


Associated Systoms:  Other





Allergies and Home Medications


Allergies


Coded Allergies:  


     Penicillins (Verified  Allergy, Unknown, 12/19/19)


     escitalopram (Verified  Allergy, Unknown, 12/19/19)


     morphine (Verified  Allergy, Unknown, 12/19/19)





Home Medications


Atorvastatin Calcium 40 Mg Tablet, 40 MG PO DAILY, (Reported)


Clonazepam 0.5 Mg Tablet, 0.5 MG PO DAILY, (Reported)


Gabapentin 600 Mg Tablet, 600 MG PO QID, (Reported)


Hydrocodone/Acetaminophen 1 Each Tablet, 1 TAB PO Q4H PRN for PAIN-MODERATE (5-

7), (Reported)


Levocetirizine Dihydrochloride 5 Mg Tablet, 5 MG PO DAILY, (Reported)


Linaclotide 290 Mcg Capsule, 290 MCG PO DAILY, (Reported)


Montelukast Sodium 10 Mg Tablet, 10 MG PO DAILY, (Reported)


Nitrofurantoin Macrocrystal 100 Mg Capsule, 100 MG PO DAILY, (Reported)


Pantoprazole Sodium 40 Mg Tablet.dr, 40 MG PO DAILY, (Reported)


Promethazine HCl 25 Gm Powder, 25 MG PO DAILY, (Reported)


Topiramate 50 Mg Tablet, 50 MG PO DAILY, (Reported)


Trazodone HCl 50 Mg Tablet, 50 MG PO DAILY, (Reported)


Zolpidem Tartrate 10 Mg Tablet, 10 MG PO HS, (Reported)





Patient Home Medication List


Home Medication List Reviewed:  Yes





Review of Systems


Review of Systems


Constitutional:  see HPI


EENTM:  see HPI


Respiratory:  see HPI


Cardiovascular:  see HPI


Gastrointestinal:  see HPI


Genitourinary:  see HPI


Musculoskeletal:  see HPI


Skin:  see HPI


Psychiatric/Neurological:  See HPI


Hematologic/Lymphatic:  See HPI


Immunological/Allergic:  see HPI





All Other Systems Reviewed


Negative Unless Noted:  Yes





Past Medical-Social-Family Hx


Immunizations Up To Date


PED Vaccines UTD:  No





Seasonal Allergies


Seasonal Allergies:  Yes





Past Medical History


Breast, Hysterectomy


Respiratory:  Yes


Asthma


Cardiac:  Yes


Hypertension


Neurological:  No


Headaches /Migraines


GYN History:  Hysterectomy


Genitourinary:  No


Gastrointestinal:  Yes


Chronic Constipation


Musculoskeletal:  Yes


Fibromyalgia


Endocrine:  No


HEENT:  No


Cataract


Loss of Vision:  Denies


Hearing Impairment:  Denies


Cancer:  No


Psychosocial:  Yes


Anxiety, Depression


Integumentary:  No


Blood Disorders:  No





Physical Exam


Vital Signs





Vital Signs - First Documented








 7/22/21





 15:23


 


Temp 36.7


 


Pulse 75


 


Resp 18


 


B/P (MAP) 90/53 (65)


 


Pulse Ox 96


 


O2 Delivery Room Air





Capillary Refill :


Height, Weight, BMI


Height: '"


Weight: lbs. oz. kg; 25.40 BMI


Method:


General Appearance:  No Apparent Distress, WD/WN, Anxious, Other (Generally weak

and fatigued appearing)


Eyes:  Bilateral Eye Normal Inspection, Bilateral Eye PERRL, Bilateral Eye EOMI


HEENT:  PERRL/EOMI, Pharynx Normal, Moist Mucous Membranes


Neck:  Full Range of Motion, Non Tender, Supple


Respiratory:  Chest Non Tender, Lungs Clear, Normal Breath Sounds, No Accessory 

Muscle Use, No Respiratory Distress


Cardiovascular:  Regular Rate, Rhythm, No Edema


Gastrointestinal:  Non Tender, Soft


Back:  Normal Inspection


Extremity:  Non Tender, No Calf Tenderness


Neurologic/Psychiatric:  Alert, Oriented x3, No Motor/Sensory Deficits





Focused Exam


Sepsis Stage:  Ruled Out


Lactate Level


7/22/21 16:05: Lactic Acid Level 1.23





Lactic Acid Level





Laboratory Tests








Test


 7/22/21


16:05


 


Lactic Acid Level


 1.23 MMOL/L


(0.50-2.00)











Progress/Results/Core Measures


Suspected Sepsis


SIRS


Temperature: 


Pulse:  


Respiratory Rate: 


 


Laboratory Tests


7/22/21 16:05: White Blood Count 15.8H


Blood Pressure  / 


Mean: 


 





7/22/21 16:05: Lactic Acid Level 1.23








Laboratory Tests


7/22/21 16:05: 


Creatinine 1.09, Platelet Count 254, Total Bilirubin 0.4





Results/Orders


Lab Results





Laboratory Tests








Test


 7/22/21


15:26 7/22/21


16:05 Range/Units


 


 


Urine Color YELLOW    


 


Urine Clarity CLOUDY H   


 


Urine pH 5.5   5-9  


 


Urine Specific Gravity 1.025 H  1.016-1.022  


 


Urine Protein NEGATIVE   NEGATIVE  


 


Urine Glucose (UA) NEGATIVE   NEGATIVE  


 


Urine Ketones NEGATIVE   NEGATIVE  


 


Urine Nitrite NEGATIVE   NEGATIVE  


 


Urine Bilirubin NEGATIVE   NEGATIVE  


 


Urine Urobilinogen 0.2   < = 1.0  MG/DL


 


Urine Leukocyte Esterase NEGATIVE   NEGATIVE  


 


Urine RBC (Auto) NEGATIVE   NEGATIVE  


 


Urine RBC 0-2    /HPF


 


Urine WBC 5-10 H   /HPF


 


Urine Squamous Epithelial


Cells >50 H


 


  /HPF





 


Urine Crystals NONE    /LPF


 


Urine Bacteria TRACE    /HPF


 


Urine Casts PRESENT    /LPF


 


Urine Hyaline Casts 10-25 H   /LPF


 


Urine Mucus LARGE H   /LPF


 


Urine Culture Indicated NO    


 


Urine Opiates Screen POSITIVE H  NEGATIVE  


 


Urine Oxycodone Screen NEGATIVE   NEGATIVE  


 


Urine Methadone Screen NEGATIVE   NEGATIVE  


 


Urine Propoxyphene Screen NEGATIVE   NEGATIVE  


 


Urine Barbiturates Screen NEGATIVE   NEGATIVE  


 


Ur Tricyclic Antidepressants


Screen NEGATIVE 


 


 NEGATIVE  





 


Urine Phencyclidine Screen NEGATIVE   NEGATIVE  


 


Urine Amphetamines Screen NEGATIVE   NEGATIVE  


 


Urine Methamphetamines Screen NEGATIVE   NEGATIVE  


 


Urine Benzodiazepines Screen POSITIVE H  NEGATIVE  


 


Urine Cocaine Screen NEGATIVE   NEGATIVE  


 


Urine Cannabinoids Screen NEGATIVE   NEGATIVE  


 


White Blood Count


 


 15.8 H


 4.3-11.0


10^3/uL


 


Red Blood Count


 


 3.54 L


 4.35-5.85


10^6/uL


 


Hemoglobin  11.7  11.5-16.0  G/DL


 


Hematocrit  35  35-52  %


 


Mean Corpuscular Volume  98  80-99  FL


 


Mean Corpuscular Hemoglobin  33  25-34  PG


 


Mean Corpuscular Hemoglobin


Concent 


 34 


 32-36  G/DL





 


Red Cell Distribution Width  12.7  10.0-14.5  %


 


Platelet Count


 


 254 


 130-400


10^3/uL


 


Mean Platelet Volume  10.2  7.4-10.4  FL


 


Immature Granulocyte % (Auto)  1   %


 


Neutrophils (%) (Auto)  88 H 42-75  %


 


Lymphocytes (%) (Auto)  8 L 12-44  %


 


Monocytes (%) (Auto)  3  0-12  %


 


Eosinophils (%) (Auto)  0  0-10  %


 


Basophils (%) (Auto)  0  0-10  %


 


Neutrophils # (Auto)  13.9 H 1.8-7.8  X 10^3


 


Lymphocytes # (Auto)  1.2  1.0-4.0  X 10^3


 


Monocytes # (Auto)  0.5  0.0-1.0  X 10^3


 


Eosinophils # (Auto)


 


 0.0 


 0.0-0.3


10^3/uL


 


Basophils # (Auto)


 


 0.0 


 0.0-0.1


10^3/uL


 


Immature Granulocyte # (Auto)


 


 0.1 


 0.0-0.1


10^3/uL


 


Neutrophils % (Manual)  74   %


 


Lymphocytes % (Manual)  6   %


 


Monocytes % (Manual)  5   %


 


Band Neutrophils  15   %


 


Blood Morphology Comment  NORMAL   


 


Sodium Level  141  135-145  MMOL/L


 


Potassium Level  4.1  3.6-5.0  MMOL/L


 


Chloride Level  112 H   MMOL/L


 


Carbon Dioxide Level  17 L 21-32  MMOL/L


 


Anion Gap  12  5-14  MMOL/L


 


Blood Urea Nitrogen  13  7-18  MG/DL


 


Creatinine


 


 1.09 


 0.60-1.30


MG/DL


 


Estimat Glomerular Filtration


Rate 


 50 


  





 


BUN/Creatinine Ratio  12   


 


Glucose Level  90    MG/DL


 


Lactic Acid Level


 


 1.23 


 0.50-2.00


MMOL/L


 


Calcium Level  7.4 L 8.5-10.1  MG/DL


 


Corrected Calcium  8.0 L 8.5-10.1  MG/DL


 


Magnesium Level  1.6  1.6-2.4  MG/DL


 


Total Bilirubin  0.4  0.1-1.0  MG/DL


 


Aspartate Amino Transf


(AST/SGOT) 


 24 


 5-34  U/L





 


Alanine Aminotransferase


(ALT/SGPT) 


 9 


 0-55  U/L





 


Alkaline Phosphatase  112    U/L


 


C-Reactive Protein  6.54 H <0.50  MG/DL


 


Total Protein  5.7 L 6.4-8.2  GM/DL


 


Albumin  3.3  3.2-4.5  GM/DL








My Orders





Orders - BLAISE VICK DO


Urinalysis (7/22/21 15:37)


Cbc With Automated Diff (7/22/21 15:37)


Comprehensive Metabolic Panel (7/22/21 15:37)


Ns Iv 1000 Ml (Sodium Chloride 0.9%) (7/22/21 15:45)


Crp Fs (7/22/21 15:38)


Lactic Acid Analyzer (7/22/21 15:38)


Blood Culture (7/22/21 15:38)


Drug Screen Stat (Urine) (7/22/21 15:39)


Magnesium (7/22/21 15:39)


Ns Iv 1000 Ml (Sodium Chloride 0.9%) (7/22/21 15:45)


Chest 1 View Ap/Pa Only (7/22/21 15:39)


Ct Head Wo (7/22/21 15:39)


Blood Culture (7/22/21 16:05)


Manual Differential (7/22/21 16:05)


Ceftriaxone (Rocephin) (7/22/21 16:45)





Medications Given in ED





Current Medications








 Medications  Dose


 Ordered  Sig/Alex


 Route  Start Time


 Stop Time Status Last Admin


Dose Admin


 


 Ceftriaxone


 Sodium 1000 mg/


 Sterile Water  10 ml @ 


 200 mls/hr  ONCE  ONCE


 IV  7/22/21 16:45


 7/22/21 16:47 DC 7/22/21 16:56


200 MLS/HR








Vital Signs/I&O











 7/22/21





 15:23


 


Temp 36.7


 


Pulse 75


 


Resp 18


 


B/P (MAP) 90/53 (65)


 


Pulse Ox 96


 


O2 Delivery Room Air





Capillary Refill :





Departure


Communication (Admissions)


Patient fatigued with mild hypotension and slurring of speech.  Suspect 

combination of dehydration, urinary tract infection and drug effect.  CT head 

nonacute.  IV fluids given.  Labs reviewed.  Antibiotics given.  Recommend 

supportive care with PCP follow-up and continued therapeutic care.  Symptoms 

improved.  Return precautions reviewed.  Patient verbalizes understanding 

agreement discharge instructions prior to departure





Impression





   Primary Impression:  


   Urinary tract infection


   Additional Impressions:  


   Hypotension


   Generalized weakness


Disposition:  01 HOME, SELF-CARE


Condition:  Stable





Departure-Patient Inst.


Decision time for Depature:  17:25


Referrals:  


CAROLYN SOLIMAN MD (PCP/Family)


Primary Care Physician


Patient Instructions:  Weakness ED, Urinary Tract Infections in Adults, 

Generalized Weakness (DC)





Add. Discharge Instructions:  


Please increase fluids and follow-up and take newly prescribed medication as 

directed.  Do not take hydrocodone or Ambien until after follow-up with your 

PCP.  Follow-up with your PCP in 3 to 5 days for reevaluation.  Return to the ED

if new or worsening symptoms


Scripts


Doxycycline Hyclate (Doxycycline Hyclate) 100 Mg Tablet


100 MG PO BID, #14 TAB 0 Refills


   Prov: BLAISE VICK DO         7/22/21 


Doxycycline Hyclate (Doxycycline Hyclate) 100 Mg Tablet


100 MG PO BID, #20 TAB


   Prov: BLAISE VICK DO         7/22/21











BLAISE VICK DO                   Jul 22, 2021 16:03

## 2021-07-22 NOTE — DIAGNOSTIC IMAGING REPORT
PROCEDURE: CT head without contrast.



TECHNIQUE: Multiple contiguous axial images were obtained through

the brain without the use of intravenous contrast. Auto Exposure

Controls were utilized during the CT exam to meet ALARA standards

for radiation dose reduction. 



INDICATION: Altered mental status. Head pain and weakness.



COMPARISON: Head CT compared to 12/19/2019.



FINDINGS: There is no intracranial hemorrhage, hydrocephalus,

edema, mass, mass effect, nor evidence for an elevation of the

intracerebral pressures. The basilar cisterns are patent. There

is no sulcal effacement. There was no mass or mass effect. The

orbits, sinuses, and calvarium all appeared stable and

unremarkable with small right maxillary sinus mucous retention

cyst noted incidentally.



IMPRESSION: Stable CT head showed no hemorrhage, edema, or

acute-appearing abnormalities. Unchanged from prior.



Dictated by: 



  Dictated on workstation # [id number]

## 2021-07-22 NOTE — DIAGNOSTIC IMAGING REPORT
INDICATION: Malaise and weakness 



COMPARISON with radiographs 12/29/2020



FINDINGS: Some rightward convexity thoracolumbar scoliotic

curvature, chronic. Cardiomediastinal and hilar contours

unremarkable. Densities owing to bilateral breast implants

project over the lung bases, chronic. No focal consolidation,

effusion, pneumothorax or failure pattern. No free air beneath

the diaphragms. The lung markings more conspicuous today but this

is believed to be on a technical basis. Some linear scarring in

the right mid lung laterally, chronic.



IMPRESSION:



Stable chronic findings. No acute appearing abnormality. 



Dictated by: 



  Dictated on workstation # [id number]

## 2022-10-20 ENCOUNTER — HOSPITAL ENCOUNTER (INPATIENT)
Dept: HOSPITAL 75 - IRF | Age: 67
LOS: 4 days | Discharge: HOME HEALTH | DRG: 560 | End: 2022-10-24
Attending: INTERNAL MEDICINE | Admitting: INTERNAL MEDICINE
Payer: MEDICARE

## 2022-10-20 VITALS — DIASTOLIC BLOOD PRESSURE: 62 MMHG | SYSTOLIC BLOOD PRESSURE: 139 MMHG

## 2022-10-20 VITALS — DIASTOLIC BLOOD PRESSURE: 55 MMHG | SYSTOLIC BLOOD PRESSURE: 104 MMHG

## 2022-10-20 VITALS — BODY MASS INDEX: 27.77 KG/M2 | HEIGHT: 57.99 IN | WEIGHT: 132.28 LBS

## 2022-10-20 DIAGNOSIS — F11.20: ICD-10-CM

## 2022-10-20 DIAGNOSIS — I10: ICD-10-CM

## 2022-10-20 DIAGNOSIS — Z96.641: ICD-10-CM

## 2022-10-20 DIAGNOSIS — M79.7: ICD-10-CM

## 2022-10-20 DIAGNOSIS — M41.9: ICD-10-CM

## 2022-10-20 DIAGNOSIS — K59.00: ICD-10-CM

## 2022-10-20 DIAGNOSIS — Z88.5: ICD-10-CM

## 2022-10-20 DIAGNOSIS — F13.20: ICD-10-CM

## 2022-10-20 DIAGNOSIS — F41.9: ICD-10-CM

## 2022-10-20 DIAGNOSIS — E87.6: ICD-10-CM

## 2022-10-20 DIAGNOSIS — Z47.1: Primary | ICD-10-CM

## 2022-10-20 DIAGNOSIS — Z66: ICD-10-CM

## 2022-10-20 DIAGNOSIS — G89.4: ICD-10-CM

## 2022-10-20 DIAGNOSIS — F32.A: ICD-10-CM

## 2022-10-20 PROCEDURE — 82607 VITAMIN B-12: CPT

## 2022-10-20 PROCEDURE — 80053 COMPREHEN METABOLIC PANEL: CPT

## 2022-10-20 PROCEDURE — 83735 ASSAY OF MAGNESIUM: CPT

## 2022-10-20 PROCEDURE — 83540 ASSAY OF IRON: CPT

## 2022-10-20 PROCEDURE — 85025 COMPLETE CBC W/AUTO DIFF WBC: CPT

## 2022-10-20 PROCEDURE — 36415 COLL VENOUS BLD VENIPUNCTURE: CPT

## 2022-10-20 RX ADMIN — ACETAMINOPHEN SCH MG: 325 TABLET ORAL at 21:12

## 2022-10-20 RX ADMIN — DOCUSATE SODIUM SCH MG: 100 CAPSULE ORAL at 14:07

## 2022-10-20 RX ADMIN — POLYETHYLENE GLYCOL (3350) SCH GM: 17 POWDER, FOR SOLUTION ORAL at 14:08

## 2022-10-20 RX ADMIN — DOCUSATE SODIUM AND SENNOSIDES SCH EA: 8.6; 5 TABLET, FILM COATED ORAL at 21:13

## 2022-10-20 RX ADMIN — ZOLPIDEM TARTRATE PRN MG: 5 TABLET ORAL at 21:11

## 2022-10-20 RX ADMIN — TOPIRAMATE SCH MG: 100 TABLET, FILM COATED ORAL at 21:10

## 2022-10-20 RX ADMIN — LORATADINE SCH MG: 10 TABLET ORAL at 18:05

## 2022-10-20 RX ADMIN — DOCUSATE SODIUM AND SENNOSIDES SCH EA: 8.6; 5 TABLET, FILM COATED ORAL at 14:08

## 2022-10-20 RX ADMIN — GABAPENTIN SCH MG: 600 TABLET, FILM COATED ORAL at 21:12

## 2022-10-20 RX ADMIN — GABAPENTIN SCH MG: 600 TABLET, FILM COATED ORAL at 14:03

## 2022-10-20 RX ADMIN — GABAPENTIN SCH MG: 600 TABLET, FILM COATED ORAL at 18:05

## 2022-10-20 RX ADMIN — POLYETHYLENE GLYCOL (3350) SCH GM: 17 POWDER, FOR SOLUTION ORAL at 19:49

## 2022-10-20 RX ADMIN — SUMATRIPTAN SUCCINATE PRN MG: 50 TABLET, FILM COATED ORAL at 21:15

## 2022-10-20 RX ADMIN — DOCUSATE SODIUM AND SENNOSIDES SCH EA: 8.6; 5 TABLET, FILM COATED ORAL at 14:07

## 2022-10-20 RX ADMIN — TRAZODONE HYDROCHLORIDE SCH MG: 50 TABLET ORAL at 21:12
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
## 2022-10-20 NOTE — PHYSICAL THERAPY DAILY NOTE
PT Daily Note-Current


Subjective


Patient in WC pre tx, agrees to PT, has 10/10 pain in right leg, nurse is in 

room and is aware of her pain.  Will be co-treating with OT due to poor patient 

mobility, strength, endurance, severe pain with activity, coordinate UE and LE 

with activity, safety and reduce risk of falls.





Pain


Section J - Health Conditions


1. Rarely or not at all


2. Occasionally


3. Frequently


4. Almost constantly


8. Unable to answer


Pain Effect on Sleep:  8


Pain Interference with Therapy:  8


Pain Interference w/Day-to-Day:  8





Appearance


Patient in WC at bedside post tx with nurse call, phone, tray, all needs met.





Mental Status


Patient Orientation:  Person, Place, Situation


wound vac





Transfers


SCALE: Activities may be completed with or without assistive devices.





6-Indepedent-patient completes the activity by him/herself with no assistance 

from a helper.


5-Set-up or Clean-up Assistance-helper sets up or cleans up; patient completes 

activity. [place] assists only prior to or  


    following the activity.


4-Supervision or Touching Assistance-helper provides verbal cues and/or 

touching/steadying and/or contact guard assistance as patient completes 

activity. Assistance may be provided   


    throughout the activity or intermittently.


3-Partial/Moderate Assistance-helper does LESS THAN HALF the effort. [place] 

lifts, holds or supports trunk or limbs, but provides less than half the effort.


2-Substantial/Maximal Assistance-helper does MORE THAN HALF the effort. [place] 

lifts or holds trunk or limbs and provides more than half the effort.


4-Bpszwblvr-nhqhzy does ALL the effort. Patient does none of the effort to 

complete the activity. Or, the assistance of 2 or more helpers is required for 

the patient to complete the  


    activity.


If activity was not attempted, code reason:


7-Patient Refused.


9-Not Applicable-not attempted and the patient did not perform the activity 

before the current illness, exacerbation or injury.


10-Not Attempted due to Environmental Limitations-(lack of equipment, weather 

restraints, etc.).


88-Not Attempted due to Medical Conditions or Safety Concerns.


Roll Left & Right (QC):  3


Sit to Lying (QC):  3


Lying to Sitting/Side of Bed(Q:  3


Sit to Stand (QC):  3


Chair/Bed-to-Chair Xfer(QC):  4


Patient performed rolling and supine <-> sit min assist, sit <-> stand min 

assist, transfers CGA.  PT performed standing and positioning and safety during 

dressing.





Weight Bearing


Right Lower Extremity:  Right


Weight Bearing/Tolerated


Left Lower Extremity:  Left


Full Weight Bearing





Gait Training


Distance:  15'


Walk 10 feet (QC):  4


Gait Persons Needed:  1


Gait Assistive Device:  FWW


WC follow, very slow, antalgic, poor step through on right side





Wheelchair Training


Does the Pt Use a Wheelchair?:  Yes


Wheel 50 ft with 2 turns (QC):  3


Type of Wheelchair:  Manual


50', min assist, cues for direction





Stair Training


 Stair Training: Handrails/:  uses walker


#of Steps:  1


1 Step (curb) (QC):  3


Stairs:  Pattern:  Step to


Patient can go up and down 1 step using a rolling walker with min assist, 

careful cues for foot placement and safety.





Balance


Picking up an Object (QC):  4 (CGA using a reacher)





Exercises


Patient performed one standing exercise while performing an UE activity with OT 

but also working on weight bearing on right leg and endurance and strength.





Treatments


PT performed standing and positioning during dressing, WC mobility, ambulation, 

standing activity, stair training, bed mobility and transfers, OT performed UE 

activity, dressing, UE positioning and safety during activity.





Assessment


Current Status:  Poor Progress


Patient has poor motivation, cries from seemingly insignificant reasons or seems

to have panic attacks.





PT Long Term Goals


Long Term Goals


PT Long Term Goals Time Frame:  Nov 11, 2022


Roll Left & Right (QC):  6


Sit to Lying (QC):  6


Lying-Sitting on Side/Bed(QC):  6


Sit to Stand (QC):  6


Chair/Bed-to-Chair Xfer(QC):  6


Toilet Transfer (QC):  6


Car Transfer (QC):  6


Does the Patient Walk:  Yes


Walk 10 feet (QC):  6


Walk 50ft with 2 Turns (QC):  4


Walk 150 ft (QC):  4


Walking 10ft on Uneven Surface:  6


1 Step (curb) (QC):  4


4 Steps (QC):  4


12 Steps (QC):  4


Picking up an Object (QC):  4


Does the Pt use WC or Scooter?:  Yes


Wheel 50 feet with 2 turns (QC:  6


Wheel 150 feet:  6





PT Plan


Problem List


Problem List:  Activity Tolerance, Functional Strength, Safety, Balance, Gait, 

Transfer, Bed Mobility, ROM





Treatment/Plan


Treatment Plan:  Continue Plan of Care


Treatment Plan:  Bed Mobility, Education, Functional Activity Adal, Functional 

Strength, Group Therapy, Gait, Safety, Therapeutic Exercise, Transfers


Treatment Duration:  Dec 31, 2022


Frequency:  At least 5 of 7 days/Wk (IRF)


Estimated Hrs Per Day:  1.5 hours per day


Patient and/or Family Agrees t:  Yes





Safety Risks/Education


Patient Education:  Gait Training, Transfer Techniques, Steps, Reviewed Precaut

ions, Correct Positioning, Safety Issues


Teaching Recipient:  Patient


Teaching Methods:  Demonstration, Discussion


Response to Teaching:  Reinforcement Needed





Time/GCodes


Time In:  1100


Time Out:  1215


Total Billed Treatment Time:  75


Total Billed Treatment


1 visit


FA 75'





co-treated with OT from [id number]











ASMITA PASTRANA PT                Oct 20, 2022 13:02
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
## 2022-10-20 NOTE — XMS REPORT
Clinical Summary

                             Created on: 10/20/2022



Anika Moreau

External Reference #: [id number]

: 1955

Sex: Female



Demographics





                          Address                   [address]

[place]  [address]

 

                          Home Phone                [phone]

 

                          Preferred Language        English

 

                          Marital Status            Single

 

                          Sabianist Affiliation     Unknown

 

                          Race                      Unknown

 

                          Ethnic Group              Unknown





Author





                          Author                    [facility]

 

                          Organization              [facility]

 

                          Address                   Unknown

 

                          Phone                     Unavailable







Care Team Providers





                    Care Team Member Name Role                Phone

 

                    Fam Torrez MD     Unavailable         Unavailable







Source Comments

Some departments are not documenting in the electronic medical record.  If you d
o not see the information that you expected, contact Release of Information in [facility] Health Information Management department at [phone] for further assistan
ce in locating additional records.[facility]



Allergies





                                        Comments



                 Active Allergy  Reactions       Severity        Noted Date 

 

                                         



                     Morphine            ITCHING             01/15/2013 

 

                                         



                     Mosquitos           HIVES               01/15/2013 







Medications





                          End Date                  Status



              Medication   Sig          Dispensed    Refills      Start  



                                         Date  

 

                                                    Active



                     traZODone (DESYREL) 50 mg  Take 50 mg by       0   



                           tablet                    mouth at     



                                         bedtime     



                                         daily.     

 

                                                    Active



                     zolpidem (AMBIEN) 10 mg  Take 10 mg by       0   



                           tablet                    mouth at     



                                         bedtime as     



                                         needed.     

 

                                                    Active



                     ALPRAZolam (XANAX) 0.5 mg  Take 0.5 mg         0   



                           tablet                    by mouth at     



                                         bedtime as     



                                         needed.     

 

                                                    Active



                     baclofen (LIORESAL) 10 mg  Take 10 mg by       0   



                           tablet                    mouth three     



                                         times daily.     

 

                                                    Active



                     buPROPion XL (WELLBUTRIN  Take 300 mg         0   



                           XL) 300 mg tablet         by mouth     



                                         every     



                                         morning.     

 

                                                    Active



                     gabapentin (NEURONTIN)  Take 600 mg         0   



                           600 mg tablet             by mouth     



                                         three times     



                                         daily.     

 

                                                    Active



                     clonazePAM (KLONOPIN) 0.5  Take 0.5 mg         0   



                           mg tablet                 by mouth     



                                         twice daily.     

 

                                                    Active



                     estrogens, conjugated  Take 0.3 mg         0   



                           (PREMARIN) 0.3 mg tablet  by mouth     



                                         daily.     

 

                                                    Active



                     HYDROcodone-acetaminophen  Take 1 Tab by       0   



                           (+) (LORTAB) 7.5-500 mg   mouth every 4     



                           tablet                    hours as     



                                         needed.     

 

                                                    Active



                     mupirocin (BACTROBAN) 2 %  Apply  to           0   



                           topical ointment          affected area     



                                         three times     



                                         daily.     

 

                                                    Active



                     levofloxacin (LEVAQUIN)  Take 500 mg         0   



                           500 mg tablet             by mouth     



                                         daily.     

 

                                                    Active



                     meloxicam(+) (MOBIC) 15  Take 15 mg by       0   



                           mg tablet                 mouth daily.     

 

                                                    Active



                     tolterodine LA(+) (DETROL  Take 4 mg by        0   



                           LA) 4 mg capsule          mouth daily.     

 

                                                    Active



                     trimethoprim-sulfamethoxa  Take 1 Tab by       0   



                           zole (SMZ-TMP DS) 160-800  mouth twice     



                           mg tablet                 daily.     

 

                                                    Active



                     esomeprazole DR(+)  Take 40 mg by       0   



                           (NEXIUM) 40 mg capsule    mouth every     



                                         morning.     

 

                                                    Active



                     doxycycline (VIBRAMYCIN)  Take 100 mg         0   



                           100 mg tablet             by mouth     



                                         twice daily.     

 

                                                    Active



                     predniSONE (DELTASONE) 20  Take 20 mg by       0   



                           mg tablet                 mouth daily.     

 

                                                    Active



                     permethrin (ELIMITE) 5 %  Apply  to           0   



                           topical cream             affected area     



                                         once.     

 

                                                    Active



                     SUMAtriptan (IMITREX) 50  Take 50 mg by       0   



                           mg tablet                 mouth once as     



                                         needed.     

 

                                                    Active



                     triamcinolone acetonide  Apply  to           0   



                           (KENALOG) 0.1 % topical   affected area     



                           cream                     twice daily.     

 

                                                    Active



                     hydrOXYzine (ATARAX) 25  Take 25 mg by       0   



                           mg tablet                 mouth three     



                                         times daily     



                                         as needed.     

 

                                                    Active



                     tretinoin (RETIN-A) 0.025  Apply  to           0   



                           % topical cream           affected area     



                                         at bedtime     



                                         daily.     

 

                                                    Active



                     phentermine(+) 37.5 mg  Take 37.5 mg        0   



                           tablet                    by mouth     



                                         every     



                                         morning.     

 

                                                    Active



                     DULoxetine DR (CYMBALTA)  Take 60 mg by       0   



                           60 mg capsule             mouth daily.     

 

                                                    Active



                     epinephrine(+) (EPIPEN  Inject 0.3 mg       0   



                           2-RAMY) 1 mg/mL injection  to area(s) as     



                           pen                       directed once     



                                         as needed.     







Active Problems





No known active problems



Family History





   



                 Medical History  Relation        Name            Comments

 

   



                     Cancer              Father              skin cancer







   



                 Relation        Name            Status          Comments

 

   



                                         Father   







Social History





                                        Date



                 Tobacco Use     Types           Packs/Day       Years Used 

 

                                         



                                         Never Smoker    

 

    



                                         Smokeless Tobacco: Never   



                                         Used   







                                        Tobacco Cessation: Counseling Given: No









                                        Comments



                           Alcohol Use               Standard Drinks/Week 

 

                                         



                           No                        0 (1 standard drink = 0.6 o

z pure alcohol) 







 



                           Sex Assigned at Birth     Date Recorded

 

 



                                         Not on file 







Obstetrics History





Last Filed Vital Signs





                    Reading             Time Taken          Comments



                                         Vital Sign   

 

                    124/78              01/15/2013  3:17 PM CST  



                                         Blood Pressure   

 

                    -                   -                    



                                         Pulse   

 

                    -                   -                    



                                         Temperature   

 

                    -                   -                    



                                         Respiratory Rate   

 

                    -                   -                    



                                         Oxygen Saturation   

 

                    -                   -                    



                                         Inhaled Oxygen   



                                         Concentration   

 

                    64.1 kg (141 lb 6.4 oz) 01/15/2013  3:17 PM CST  



                                         Weight   

 

                    152.4 cm (5')       01/15/2013  3:17 PM CST  



                                         Height   

 

                    27.62               01/15/2013  3:17 PM CST  



                                         Body Mass Index   







Plan of Treatment





   



                 Health Maintenance  Due Date        Last Done       Comments

 

   



                           COVID-19 VACCINE (#1)     1955  

 

   



                           DTAP/TDAP VACCINES (1 -   1973  



                                         Tdap)   

 

   



                           HEPATITIS C SCREENING     1973  

 

   



                           PHYSICAL (COMPREHENSIVE)  1973  



                                         EXAM   

 

   



                           BREAST CANCER SCREENING   1995  

 

   



                           COLORECTAL CANCER         2000  



                                         SCREENING   

 

   



                           SHINGLES RECOMBINANT      2005  



                                         VACCINE (1 of 2)   

 

   



                           OSTEOPOROSIS              2020  



                                         SCREENING/MONITORING   

 

   



                           PNEUMOCOCCAL VACCINE (1 -  2020  



                                         PCV)   

 

   



                           DEPRESSION SCREENING      2022  

 

   



                           INFLUENZA VACCINE         2022  







Results

Not on filefrom Last 3 Months



Care Teams





                          Start Date                End Date



                     Team Member         Relationship        Specialty  

 

                          1/15/13                    



                           Fam Torrez MD          Dermatology  



                                         FORWARDING ADDRESS    



                                         UNKNOWN

## 2022-10-20 NOTE — ST COGNITIVE LINGUISTIC EVAL
Speech Evaluation-General


Medical Diagnosis


S/P Total Rt Hip Arthroplasty


Onset Date:  Oct 18, 2022





Therapy Diagnosis


Therapy Diagnosis:  Intact Cognition





Precautions


Precautions:  Fall, Hip


Precautions/Isolations:  Fall Prevention, Standard Precautions





Referral


Referring Physician:  Dr. Ramos


Reason for Referral:  Evaluation/Treatment





Medical History


Current History


The patient is a 67 year-old female, who is s/p a right DIEGO.





Social History


Current Living Status:  Alone





Speech PLF-Current Status


Prior Level of Function





The patient denied prior or recent concerns with her speech, language or


cognition.





Subjective


The patient was seated upright in her wheelchair, awake and alert, upon entrance

to her room by the clinician. The patient greeted the clinician appropriately 

and was agreeable to participation in the cognitive linguistic assessment.





Language Eval: Auditory


Comprehends Simple Yes/No Ques:  Functional


Indent/Objects Multiple Fields:  Functional


Follows 1-Step Commands:  Functional


Follows General Conversations:  Functional


The patient does display decreased processing speeds, however, does consistently

respond appropriately.





Language Eval: Verbal Language


Completes Spontaneous Greeting:  Functional


Produces Auto, Serial Info:  Functional


Imitates Simple Words/Phrases:  Functional


Word Finding:  Functional


Requests Basic Needs:  Functional


States Basic Personal Info:  Functional





Language Evaluation: Reading


Follows Simple Written Direct:  Functional





Language Evaluation: Writing


Writes to Simple Dictation:  Functional





Cognitive


Patient Orientation


The patient demonstrated independent orientation to month, day of the week, 

date, and year.





Objective Cognitive Domain


Attention:  WNL


Memory:  WNL


Problem Solving:  Functional


Visuospatial Skills:  WNL


Composite Severity Rating:  WNL


Clock Drawing Severity Rating:  WNL





Objective


Formal/Standardized Tests


[employer] Mental Status Exam ([facility])


Results


The patient demonstrated a result of +30/30 on the SLUMS correlating to 

cognitive linguistic skills within normal limits.


Oral Motor/Speech Production


The patient does not display dysarthria or apraxia of speech. The patient does 

display a decreased rate of speech overall.


Impression


The patient demonstrated cognitive linguistic skills within normal limits.





Speech Patient Assess


Expression of Ideas/Wants:  Expression (4)


Understanding Verbal Content:  Understands (4)


Brief Interview-Mental Status:  Yes


Repetition of Three Words:  Three (3)


Temporal Orientation: Year:  Correct (3)


Temporal Orientation: Month:  Accurate within 5 days(2)


Temporal Orientation: Day:  Correct (1)


Recall : Wear to say "Sock":  Yes, no cue required (2)


Recall : Color:  Yes, no cue required (2)


Recall : Bed:  Yes, no cue required (2)


Memory/Recall Ability:  Current season, That he or she is in a hsp/hsp unit





Speech-Plan


Treatment Plan


Speech Therapy Treatment Plan:  Discontinue ST


Treatment Duration:  Oct 20, 2022


Frequency:  1 time per week


Estimated Hrs Per Day:  .5 hour per day


Rehab Potential:  Guarded





Safety Risks/Education


Teaching Recipient:  Patient


Teaching Methods:  Discussion


Response to Teaching:  Verbalize Understanding


Education Topics Provided:  


Results, Recommendations, Plan of Care





Time


Speech Therapy Time In:  12:15


Speech Therapy Time Out:  12:45


Total Billed Time:  30


Billed Treatment Time


1, VITO [facility]               Oct 20, 2022 12:26

## 2022-10-20 NOTE — PHYSICAL THERAPY EVALUATION
PT Evaluation-General


Medical Diagnosis


Admission Date


Oct 20, 2022 at 10:27


Medical Diagnosis:  S/P Total Rt hip arthroplasty


Onset Date:  Oct 18, 2022





Therapy Diagnosis


Therapy Diagnosis:  Gait deficit, strength deficit





Precautions


Precautions/Isolations:  Fall Prevention


Anterior hip precautions





Weight Bear Status


Right Lower Extremity:  Right


Weight Bearing/Tolerated


Left Lower Extremity:  Left


Full Weight Bearing





Referral


Physician:  Dr. Ramos


Reason for Referral:  Evaluation/Treatment





Medical History


Reviewed History:  Yes





Social History


Home:  Single Level


Current Living Status:  Alone


Entry Into Home:  Stairs With Railing


PT Steps Into Home:  4


Patients daughter in law reports they are in the process of installing a ramp





Prior


Prior Level of Function


SCALE: Activities may be completed with or without assistive devices.





6-Indepedent-patient completes the activity by him/herself with no assistance 

from a helper.


5-Set-up or Clean-up Assistance-helper sets up or cleans up; patient completes 

activity. [place] assists only prior to or  


    following the activity.


4-Supervision or Touching Assistance-helper provides verbal cues and/or 

touching/steadying and/or contact guard assistance as patient completes a

ctivity. Assistance may be provided   


    throughout the activity or intermittently.


3-Partial/Moderate Assistance-helper does LESS THAN HALF the effort. [place] 

lifts, holds or supports trunk or limbs, but provides less than half the effort.


2-Substantial/Maximal Assistance-helper does MORE THAN HALF the effort. [place] 

lifts or holds trunk or limbs and provides more than half the effort.


4-Lojusr[place]-zonaib does ALL the effort. Patient does none of the effort to 

complete the activity. Or, the assistance of 2 or more helpers is required for 

the patient to complete the  


    activity.


If activity was not attempted, code reason:


7-Patient Refused.


9-Not Applicable-not attempted and the patient did not perform the activity 

before the current illness, exacerbation or injury.


10-Not Attempted due to Environmental Limitations-(lack of equipment, weather 

restraints, etc.).


88-Not Attempted due to Medical Conditions or Safety Concerns.


Bed Mobility:  6


Transfers (B,C,W/C):  6


Gait:  6


Stairs:  6


Indoor Mobility (Ambulation):  Independent


Stairs:  Independent


Prior Devices Use:  Walker





PT Evaluation-Current


Subjective


Patient presents to this rehab unit via private vehicle, agreeable to PT 

evaluation upon arrival. Patient currently rates right hip pain at 8/10 however 

notes she is also starting to get a migraine headache. Nurse notified.





Pain


Section J - Health Conditions


1. Rarely or not at all


2. Occasionally


3. Frequently


4. Almost constantly


8. Unable to answer


Pain Effect on Sleep:  8


Pain Interference with Therapy:  8


Pain Interference w/Day-to-Day:  8





Pt/Family Goals


Get stronger, become more independent or return to PLOF and return home.





Objective


Patient Orientation:  Person, Place, Time, Situation


Attachments:  Other-See Comments


wound vac right hip





ROM/Strength


ROM Lower Extremities


Bilateral LEs limited in all ROMs due to pain.  Right hip limited ~ 75% in all 

planes.


Strength Lower Extremities


Right hip 2+/5 all planes, right knee 3/5 flexion and extension, right ankle 

3+/5 all planes


Left LE 3+/5 all planes





Neuromuscular


(Tone, Coordination, Reflexes)


Unable to assess at this time due to pain.





Sensory


Vision:  Functional


Hearing:  Functional


Sensation Right Lower Extremit:  Intact


Sensation Left Lower Extremity:  Intact





Transfers


Roll Left & Right (QC):  88


Sit to Lying (QC):  88


Lying to Sitting/Side of Bed(Q:  88


Sit to Stand (QC):  3


Chair/Bed-to-Chair Xfer(QC):  3


Toilet Transfer (QC):  88


Car Transfer (QC):  2





Gait


Does the Patient Walk?:  Yes


Mode of Locomotion:  Walk


Anticipated Mode of Locomotion:  Walk


Walk 10 feet (QC):  3


Walk 50 ft with 2 Turns(QC):  88


Walk 150 ft (QC):  88


Walking 10ft/uneven surface-QC:  88


Distance:  30


Gait Assistive Device:  FWW





Wheelchair Training


Does the Pt Use a Wheelchair?:  Yes


Distance:  20


Wheel 50 ft with 2 turns (QC):  88


Wheel 150 ft (QC):  88


Type of Wheelchair:  Manual





Stairs


#of Steps:  0


1 Step (curb) (QC):  88


4 Steps (QC):  88


12 Steps (QC):  88


Unable to assess safely due to pain at this time





Balance


Sitting Static:  Fair


Sitting Dynamic:  Fair


Standing Static:  Poor


 Standing Dynamic:  Poor


Picking up an Object (QC):  88





Assessment/Needs


Patient reports right hip very painful and does not tolerate much activity.   

She requires max A for car transfers, mod A for sit to stand. Patient ambulates 

30 feet with FWW, with min A and verbal cues for safety, progression, posture 

and conservation of energy.  Patient propels w/c 20 feet with SBA.  Patient in 

w/c post evaluation and left resting at this time due to reports of severe pain 

in right hip and nurse notified. Patient in w/c post eval with call light in 

reach, all needs met, nurse in the room and family in the room.


Rehab Potential:  Fair


Equipment Needs


W/C, shower chair, Toilet riser





PT Long Term Goals


Long Term Goals


PT Long Term Goals Time Frame:  Nov 11, 2022


Scoring


Section J - Health Conditions


1. Rarely or not at all


2. Occasionally


3. Frequently


4. Almost constantly


8. Unable to answer


Roll Left to Right (QC):  6


Sit to Lying (QC):  6


Lying-Sitting on Side/Bed(QC):  6


Sit to Stand (QC):  6


Chair/Bed-to-Chair Xfer(QC):  6


Toilet/Commode Transfer (QC):  6


Car Transfer (QC):  6


Does the Patient Walk:  Yes


Walk 10 feet (QC):  6


Walk 10ft-Uneven Surface(QC):  6


Walk 50ft with 2 Turns (QC):  4


Walk 150 ft (QC):  4


Gait Assistive Device:  FWW


Does the Pt use WC or Scooter?:  Yes


Wheel 50 feet with 2 turns (QC:  6


1 Step (curb) (QC):  4


4 Steps (QC):  4


12 Steps (QC):  4


Picking up an Object (QC):  4


Wheel 150 feet - 6





PT Plan


Problem List


Problem List:  Activity Tolerance, Functional Strength, Safety, Balance, Gait, 

Transfer, Bed Mobility, ROM





Treatment/Plan


Treatment Plan:  Continue Plan of Care


Treatment Plan:  Bed Mobility, Education, Functional Activity Adal, Functional 

Strength, Group Therapy, Gait, Safety, Therapeutic Exercise, Transfers


Treatment Duration:  Dec 31, 2022


Frequency:  At least 5 of 7 days/Wk (IRF)


Estimated Hrs Per Day:  1.5 hours per day


Patient and/or Family Agrees t:  Yes





Safety Risks/Education


Patient Education:  Gait Training, Transfer Techniques, W/C Management


Teaching Recipient:  Patient, Family


Teaching Methods:  Demonstration, Discussion


Response to Teaching:  Reinforcement Needed





Time/GCodes


Time In:  1030


Time Out:  1040


Total Billed Treatment Time:  10


Total Billed Treatment


Visit, MERLINE BELTRAN PT                Oct 20, 2022 11:03

## 2022-10-21 VITALS — DIASTOLIC BLOOD PRESSURE: 72 MMHG | SYSTOLIC BLOOD PRESSURE: 115 MMHG

## 2022-10-21 VITALS — DIASTOLIC BLOOD PRESSURE: 82 MMHG | SYSTOLIC BLOOD PRESSURE: 143 MMHG

## 2022-10-21 LAB
ALBUMIN SERPL-MCNC: 3 GM/DL (ref 3.2–4.5)
ALP SERPL-CCNC: 83 U/L (ref 40–136)
ALT SERPL-CCNC: 13 U/L (ref 0–55)
BASOPHILS # BLD AUTO: 0.1 10^3/UL (ref 0–0.1)
BASOPHILS NFR BLD AUTO: 1 % (ref 0–10)
BILIRUB SERPL-MCNC: 0.4 MG/DL (ref 0.1–1)
BUN/CREAT SERPL: 11
CALCIUM SERPL-MCNC: 8.4 MG/DL (ref 8.5–10.1)
CHLORIDE SERPL-SCNC: 113 MMOL/L (ref 98–107)
CO2 SERPL-SCNC: 21 MMOL/L (ref 21–32)
CREAT SERPL-MCNC: 0.73 MG/DL (ref 0.6–1.3)
EOSINOPHIL # BLD AUTO: 0.3 10^3/UL (ref 0–0.3)
EOSINOPHIL NFR BLD AUTO: 3 % (ref 0–10)
GFR SERPLBLD BASED ON 1.73 SQ M-ARVRAT: 90 ML/MIN
GLUCOSE SERPL-MCNC: 83 MG/DL (ref 70–105)
HCT VFR BLD CALC: 32 % (ref 35–52)
HGB BLD-MCNC: 10.9 G/DL (ref 11.5–16)
LYMPHOCYTES # BLD AUTO: 1.8 10^3/UL (ref 1–4)
LYMPHOCYTES NFR BLD AUTO: 23 % (ref 12–44)
MANUAL DIFFERENTIAL PERFORMED BLD QL: NO
MCH RBC QN AUTO: 34 PG (ref 25–34)
MCHC RBC AUTO-ENTMCNC: 34 G/DL (ref 32–36)
MCV RBC AUTO: 101 FL (ref 80–99)
MONOCYTES # BLD AUTO: 0.4 10^3/UL (ref 0–1)
MONOCYTES NFR BLD AUTO: 5 % (ref 0–12)
NEUTROPHILS # BLD AUTO: 5.4 10^3/UL (ref 1.8–7.8)
NEUTROPHILS NFR BLD AUTO: 68 % (ref 42–75)
PLATELET # BLD: 256 10^3/UL (ref 130–400)
PMV BLD AUTO: 9.5 FL (ref 9–12.2)
POTASSIUM SERPL-SCNC: 3 MMOL/L (ref 3.6–5)
PROT SERPL-MCNC: 5.6 GM/DL (ref 6.4–8.2)
SODIUM SERPL-SCNC: 142 MMOL/L (ref 135–145)
WBC # BLD AUTO: 8 10^3/UL (ref 4.3–11)

## 2022-10-21 RX ADMIN — POTASSIUM CHLORIDE SCH MEQ: 1500 TABLET, EXTENDED RELEASE ORAL at 17:39

## 2022-10-21 RX ADMIN — MONTELUKAST SCH MG: 10 TABLET, FILM COATED ORAL at 08:57

## 2022-10-21 RX ADMIN — GABAPENTIN SCH MG: 600 TABLET, FILM COATED ORAL at 08:58

## 2022-10-21 RX ADMIN — ACETAMINOPHEN SCH MG: 325 TABLET ORAL at 06:27

## 2022-10-21 RX ADMIN — ACETAMINOPHEN SCH MG: 325 TABLET ORAL at 13:35

## 2022-10-21 RX ADMIN — POLYETHYLENE GLYCOL (3350) SCH GM: 17 POWDER, FOR SOLUTION ORAL at 08:55

## 2022-10-21 RX ADMIN — ACETAMINOPHEN SCH MG: 325 TABLET ORAL at 22:09

## 2022-10-21 RX ADMIN — LORATADINE SCH MG: 10 TABLET ORAL at 17:39

## 2022-10-21 RX ADMIN — POTASSIUM CHLORIDE SCH MEQ: 1500 TABLET, EXTENDED RELEASE ORAL at 08:55

## 2022-10-21 RX ADMIN — Medication SCH EA: at 06:26

## 2022-10-21 RX ADMIN — PANTOPRAZOLE SODIUM SCH MG: 40 TABLET, DELAYED RELEASE ORAL at 11:27

## 2022-10-21 RX ADMIN — PANTOPRAZOLE SODIUM SCH MG: 40 TABLET, DELAYED RELEASE ORAL at 08:58

## 2022-10-21 RX ADMIN — DOCUSATE SODIUM AND SENNOSIDES SCH EA: 8.6; 5 TABLET, FILM COATED ORAL at 08:57

## 2022-10-21 RX ADMIN — Medication SCH MCG: at 08:55

## 2022-10-21 RX ADMIN — DOCUSATE SODIUM SCH MG: 100 CAPSULE ORAL at 22:20

## 2022-10-21 RX ADMIN — POTASSIUM CHLORIDE SCH MEQ: 1500 TABLET, EXTENDED RELEASE ORAL at 13:36

## 2022-10-21 RX ADMIN — POLYETHYLENE GLYCOL (3350) SCH GM: 17 POWDER, FOR SOLUTION ORAL at 22:20

## 2022-10-21 RX ADMIN — GABAPENTIN SCH MG: 600 TABLET, FILM COATED ORAL at 22:09

## 2022-10-21 RX ADMIN — TRAZODONE HYDROCHLORIDE SCH MG: 50 TABLET ORAL at 22:09

## 2022-10-21 RX ADMIN — GABAPENTIN SCH MG: 600 TABLET, FILM COATED ORAL at 13:35

## 2022-10-21 RX ADMIN — DOCUSATE SODIUM AND SENNOSIDES SCH EA: 8.6; 5 TABLET, FILM COATED ORAL at 22:20

## 2022-10-21 RX ADMIN — TOPIRAMATE SCH MG: 100 TABLET, FILM COATED ORAL at 22:09

## 2022-10-21 RX ADMIN — GABAPENTIN SCH MG: 600 TABLET, FILM COATED ORAL at 17:39

## 2022-10-21 RX ADMIN — TOPIRAMATE SCH MG: 100 TABLET, FILM COATED ORAL at 08:55

## 2022-10-21 RX ADMIN — SUMATRIPTAN SUCCINATE PRN MG: 50 TABLET, FILM COATED ORAL at 22:19

## 2022-10-21 NOTE — PHYSICAL THERAPY DAILY NOTE
PT Daily Note-Current


Subjective


Patient in restroom pre tx, already working with OT, patient voices some 

complaints of pain, has discussion with doctor about it.  Will be co-treating 

with OT for part of tx due to poor patient mobility, strength, endurance, sever 

debility and pain with activity, coordinate UE and LE during activity, safety 

and reduce risk of falls.





Pain


Section J - Health Conditions


1. Rarely or not at all


2. Occasionally


3. Frequently


4. Almost constantly


8. Unable to answer


Pain Effect on Sleep:  8


Pain Interference with Therapy:  8


Pain Interference w/Day-to-Day:  8





Appearance


Patient in bed post-tx with table, nurse call, phone, and all other needs met.





Mental Status


Patient Orientation:  Person, Place, Situation


R Hip Wound Vac





Transfers


SCALE: Activities may be completed with or without assistive devices.





6-Indepedent-patient completes the activity by him/herself with no assistance 

from a helper.


5-Set-up or Clean-up Assistance-helper sets up or cleans up; patient completes 

activity. [place] assists only prior to or  


    following the activity.


4-Supervision or Touching Assistance-helper provides verbal cues and/or 

touching/steadying and/or contact guard assistance as patient completes 

activity. Assistance may be provided   


    throughout the activity or intermittently.


3-Partial/Moderate Assistance-helper does LESS THAN HALF the effort. [place] 

lifts, holds or supports trunk or limbs, but provides less than half the effort.


2-Substantial/Maximal Assistance-helper does MORE THAN HALF the effort. [place] 

lifts or holds trunk or limbs and provides more than half the effort.


3-Szrvzmhlt-fnxigz does ALL the effort. Patient does none of the effort to 

complete the activity. Or, the assistance of 2 or more helpers is required for 

the patient to complete the  


    activity.


If activity was not attempted, code reason:


7-Patient Refused.


9-Not Applicable-not attempted and the patient did not perform the activity 

before the current illness, exacerbation or injury.


10-Not Attempted due to Environmental Limitations-(lack of equipment, weather 

restraints, etc.).


88-Not Attempted due to Medical Conditions or Safety Concerns.


Sit to Stand (QC):  4


Chair/Bed-to-Chair Xfer(QC):  4


SBA





Weight Bearing


Right Lower Extremity:  Right


Weight Bearing/Tolerated


Left Lower Extremity:  Left


Full Weight Bearing





Gait Training


Does the Patient Walk?:  Yes


Distance:  50ft, 100ft


Walk 10 feet (QC):  4


Walk 50 ft with 2 Turns(QC):  4


Gait Persons Needed:  1


Gait Assistive Device:  Walker Standard


CGA, patient walked slow speed, decreased foot clearance, decreased step length 

but improved step through, severe kyphosis, moderate forward head.





Exercises


Standing:  Hip Abduction, Hamstring curls, Heel/toe raises, Mini squats


Standing Reps:  10


Hip Flexion 10 reps





Treatments


PT performed LE strengthening, Gait training, standing and positioning during 

toileting and dressing, OT performed toileting and dressing, ADL's at sink





Assessment


Current Status:  Fair Progress


Patient needed a 1 break during ambulation and 1 break during LE strengthening 

exercises





PT Long Term Goals


Long Term Goals


PT Long Term Goals Time Frame:  Nov 11, 2022


Roll Left & Right (QC):  6


Sit to Lying (QC):  6


Lying-Sitting on Side/Bed(QC):  6


Sit to Stand (QC):  6


Chair/Bed-to-Chair Xfer(QC):  6


Toilet Transfer (QC):  6


Car Transfer (QC):  6


Does the Patient Walk:  Yes


Walk 10 feet (QC):  6


Walk 50ft with 2 Turns (QC):  4


Walk 150 ft (QC):  4


Walking 10ft on Uneven Surface:  6


1 Step (curb) (QC):  4


4 Steps (QC):  4


12 Steps (QC):  4


Picking up an Object (QC):  4


Does the Pt use WC or Scooter?:  Yes


Wheel 50 feet with 2 turns (QC:  6


Wheel 150 feet:  6





PT Plan


Problem List


Problem List:  Activity Tolerance, Functional Strength, Safety, Balance, Gait, 

Transfer, Bed Mobility, ROM





Treatment/Plan


Treatment Plan:  Continue Plan of Care


Treatment Plan:  Bed Mobility, Education, Functional Activity Adal, Functional 

Strength, Group Therapy, Gait, Safety, Therapeutic Exercise, Transfers


Treatment Duration:  Dec 31, 2022


Frequency:  At least 5 of 7 days/Wk (IRF)


Estimated Hrs Per Day:  1.5 hours per day


Patient and/or Family Agrees t:  Yes





Safety Risks/Education


Patient Education:  Gait Training, Transfer Techniques, Correct Positioning, 

Safety Issues


Teaching Recipient:  Patient


Teaching Methods:  Demonstration, Discussion


Response to Teaching:  Reinforcement Needed





Time/GCodes


Time In:  1000


Time Out:  1100


Total Billed Treatment Time:  60


Total Billed Treatment


1 visit


Gait 25min


EX 35min





co-treated with OT from [id number]











ASMITA PASTRANA PT                Oct 21, 2022 10:54

## 2022-10-21 NOTE — PM&R PROGRESS NOTE
Subjective


HPI/CC On Admission


Date Seen by Provider:  Oct 21, 2022


Time Seen by Provider:  12:30


Subjective/Events-last exam


10/21/2022:


Patient medically stable


Replacing potassium


All she wants to talk about is her pain medication and the fact she takes more 

at home than she does here


Family and other doctors who have treated her all want to minimize pain 

medication and I informed her we will do that


Bowels are moving


Checked meds and labs





Review of Systems


General:  Fatigue, Malaise


Musculoskeletal:  leg pain





Objective


Exam


Vital Signs





Vital Signs








  Date Time  Temp Pulse Resp B/P (MAP) Pulse Ox O2 Delivery O2 Flow Rate FiO2


 


10/21/22 21:50      Room Air  


 


10/21/22 20:39 37.2 81 16 115/72 (86) 96   





Capillary Refill :


General Appearance:  No Apparent Distress, WD/WN, Chronically ill, Thin, Other 

([place])


HEENT:  PERRL/EOMI, Normal ENT Inspection, Pharynx Normal


Neck:  Full Range of Motion, Normal Inspection, Non Tender, Supple, Carotid 

Bruit


Respiratory:  Chest Non Tender, Lungs Clear, Normal Breath Sounds, No Accessory 

Muscle Use, No Respiratory Distress


Cardiovascular:  Regular Rate, Rhythm, No Edema, No Gallop, No JVD, No Murmur, 

Normal Peripheral Pulses


Gastrointestinal:  Normal Bowel Sounds, No Organomegaly, No Pulsatile Mass, Non 

Tender, Soft


Back:  Normal Inspection, No CVA Tenderness, No Vertebral Tenderness


Extremity:  Normal Capillary Refill, Normal Inspection, Normal Range of Motion 

(Except for right leg), Non Tender, No Calf Tenderness, No Pedal Edema


Neurologic/Psychiatric:  Alert, Oriented x3, Normal Mood/Affect, CNs II-XII Norm

as Tested, Abnormal Gait, Motor Weakness (Right leg weakness)


Skin:  Normal Color, Warm/Dry


Lymphatic:  No Adenopathy





Results/Procedures


Lab


Laboratory Tests


10/21/22 05:26








Patient resulted labs reviewed.





FIM


Transfers


Therapy Code Descriptions/Definitions 





Functional [place] Measure:


0=Not Assessed/NA        4=Minimal Assistance


1=Total Assistance        5=Supervision or Setup


2=Maximal Assistance  6=Modified [place]


3=Moderate Assistance 7=Complete IndependenceSCALE: Activities may be completed 

with or without assistive devices.





6-Indepedent-patient completes the activity by him/herself with no assistance 

from a helper.


5-Set-up or Clean-up Assistance-helper sets up or cleans up; patient completes 

activity. [place] assists only prior to or  


    following the activity.


4-Supervision or Touching Assistance-helper provides verbal cues and/or 

touching/steadying and/or contact guard assistance as patient completes acti

vity. Assistance may be provided   


    throughout the activity or intermittently.


3-Partial/Moderate Assistance-helper does LESS THAN HALF the effort. [place] 

lifts, holds or supports trunk or limbs, but provides less than half the effort.


2-Substantial/Maximal Assistance-helper does MORE THAN HALF the effort. [place] 

lifts or holds trunk or limbs and provides more than half the effort.


3-Noycgsfnk-nufzno does ALL the effort. Patient does none of the effort to 

complete the activity. Or, the assistance of 2 or more helpers is required for 

the patient to complete the  


    activity.


If activity was not attempted, code reason:


7-Patient Refused.


9-Not Applicable-not attempted and the patient did not perform the activity 

before the current illness, exacerbation or injury.


10-Not Attempted due to Environmental Limitations-(lack of equipment, weather 

restraints, etc.).


88-Not Attempted due to Medical Conditions or Safety Concerns.


Roll Left to Right (QC):  3


Sit to Lying (QC):  3


Sit to Stand (QC):  3


Chair/Bed-to-Chair Xfer(QC):  4


Car Transfer (QC):  2





Gait Training


Does the Patient Walk?:  Yes


Distance:  15'


Walk 10 feet (QC):  4


Walk 50 ft with 2 Turns(QC):  88


Walk 150 ft (QC):  88


Walking 10ft/uneven surface-QC:  88


Gait Persons Needed:  1


Gait Assistive Device:  FWW





Wheelchair Training


Does the Pt Use a Wheelchair?:  Yes


Distance:  20


Wheel 50 ft with 2 turns (QC):  3


Wheel 150 ft (QC):  88


Type of Wheelchair:  Manual





Stair Training


 Stair Training: Handrails/:  uses walker


#of Steps:  1


1 Step (curb) (QC):  3


4 Steps (QC):  88


12 Steps (QC):  88


Stairs:  Pattern:  Step to





Balance


Picking up an Object (QC):  4 (CGA using a reacher)





ADL-Treatment


Eating (QC):  4


Oral Hygiene (QC):  07


Shower/Bathe Self (QC):  7 (Refused due to pain. Clarification will be needed on

showering with wound vac.)


Upper Body Dressing (QC):  07


Lower Body Dressing (QC):  3


On/Off Footwear (QC):  1


Toileting Hygiene (QC):  07





Assessment/Plan


Assessment and Plan


Assess & Plan/Chief Complaint


Assessment:


Right hip replacement by Dr. Brown uncomplicated


Slow recovery


Chronic debility


Chronic pain syndrome


Narcotic and benzodiazepine dependence


Severe scoliosis in need of extensive surgery


Fibromyalgia


Severe hypokalemia





Plan:


Supportive care


Monitor closely


Pain control but minimize medication


Aggressive PT and OT


Monitor potassium





10/21/2022:


Minimize pain meds


Replace potassium





(1) Generalized weakness


Status:  Acute


(2) S/P hip replacement











SHAGGY DIXON DO                Oct 21, 2022 06:06

## 2022-10-21 NOTE — INDIVIDUALIZED PLAN OF CARE
Individualized Plan of Care


Rehab Nursing IPOC Order


Admission Date


Oct 20, 2022 at 10:27


Current Orders





Orders


Admission Order(Inpt,Obs,Sdc) (10/19/22 16:18)


Vital Signs: Per Unit Policy ( 08,16,00 (10/19/22 16:18)


Kike Kemp 09,21 (10/19/22 16:18)


Sequential Compression Device (10/19/22 16:18)


-Inpt Rehab Con (10/19/22 16:18)


Rehab Nursing Orders-Ipoc (10/19/22 16:18)


Physical Therapy Rehab Orders (10/19/22 16:18)


Occupational Therapy Rehab Ord (10/19/22 16:18)


Speech Therapy Rehab Orders (10/19/22 16:18)


Cbc With Automated Diff (10/21/22 06:00)


Comprehensive Metabolic Panel (10/21/22 06:00)


Precautions (Aru) (10/19/22 16:18)


Rehab-Intensity Of Therapy (10/19/22 16:18)


Initiate Admission Nursing Pro .admission (10/19/22 16:18)


Alprazolam Tablet (Xanax Tablet) (10/19/22 16:30)


Calcium Carbonate Chew Tablet (Antacid C (10/19/22 16:30)


Diphenhydramine Tablet (Benadryl Tablet) (10/19/22 16:30)


Docusate Sodium Capsule (Colace Capsule) (10/19/22 21:00)


Docusate Sodium Capsule (Colace Capsule) (10/19/22 16:30)


Bisacodyl Suppository (Dulcolax Supposit (10/19/22 16:30)


Lactulose Oral Solution (Enulose Oral So (10/19/22 16:30)


Na Phos/Na Biphos Enema (Fleet Enema Kraig (10/19/22 16:30)


Guaifenesin/Codeine Syrup (Robitussin Ac (10/19/22 16:30)


Loperamide Tablet (Imodium Tablet) (10/19/22 16:30)


Melatonin  Tablet (Melatonin  Tablet) (10/19/22 16:30)


Polyethylene Glycol Powder Pkt (Miralax (10/19/22 21:00)


Ondansetron  Oral Dissolve Tab (Zofran (10/19/22 16:30)


Senna S Tablet (Senokot S Tablet) (10/19/22 21:00)


Acetaminophen Tablet/Caplet (Tylenol  T (10/19/22 16:30)


Code/Resuscitation (10/19/22 16:18)


Admission Arrival Bed Request (10/20/22 10:27)


Code/Resuscitation (10/20/22 11:53)


General/Regular (10/20/22 Lunch)


Patient Visit (10/20/22 )


Speech Sound Lang Comp (10/20/22 )


Treat. Speech/Lang/Voice (10/20/22 )


Atorvastatin Tablet (Lipitor Tablet) (10/21/22 09:00)


Clonazepam Tablet (Klonopin Tablet) (10/20/22 12:45)


Docusate Sodium Capsule (Colace Capsule) (10/20/22 21:00)


Gabapentin Capsule/Tablet (Neurontin Cap (10/20/22 13:00)


Montelukast Tablet (Singulair Tablet) (10/21/22 09:00)


Promethazine Tablet (Phenergan Tablet) (10/20/22 12:45)


Sumatriptan  Tablet (Imitrex  Tablet) (10/20/22 12:45)


Topiramate Tablet (Topamax Tablet) (10/20/22 21:00)


Trazodone Tablet (Desyrel Tablet) (10/20/22 21:00)


(Nf) Cholecalciferol (Vitamin D3) (Vitam (10/21/22 09:00)


(Nf) Clindamycin Phos (Clindamycin Phosp (10/20/22 12:45)


(Nf) Esomeprazole Magnesium (10/21/22 09:00)


(Nf) Hydroxyzine Hcl (10/20/22 12:45)


(Nf) Levocetirizine Dihydrochloride (10/20/22 18:00)


(Nf) Linaclotide (Linzess) (10/20/22 12:45)


(Nf) Metronidazole (10/20/22 12:45)


(Nf) Vitamin B Complex (10/21/22 09:00)


(Nf) Zolpidem Tartrate (Ambien) (10/20/22 12:45)


Cholecalciferol Capsule/Tablet (Vitamin (10/21/22 09:00)


Pantoprazole Tablet (Protonix Tablet) (10/21/22 09:00)


Zolpidem Tablet (Ambien Tablet) (10/20/22 12:45)


Acetaminophen Tablet/Caplet (Tylenol  T (10/20/22 14:00)


Oxycodone Immediate Rel Tablet (Oxyir Ta (10/20/22 13:00)


Loratadine Tablet (Claritin Tablet) (10/20/22 18:00)


Hydroxyzine Cap/Tab (Vistaril) (10/20/22 13:00)


Therapeutic Multivitamin Tab (Vitamins, (10/21/22 07:00)


Patient Visit (10/20/22 )


Functional Activities, Ea 15 (10/20/22 )


Relabel For Home Use (Relabel For Home U (10/20/22 14:15)


Ensure Plus Vanilla (10/20/22 14:43)


Acetaminophen Tablet/Caplet (Tylenol  T (10/20/22 22:00)


Magnesium (10/21/22 05:43)


Iron Test (Fe) (10/21/22 05:43)


Vitamin B 12 (10/21/22 05:43)


Potassium Chloride (Tablet) (K Dur Table (10/21/22 08:00)


Patient Visit (10/21/22 )


Gait Training, Ea 15 Min (10/21/22 )


Exercise Therap, Ea 15 Min (10/21/22 )





Rehab Nursing Orders:  Ongoing Assess. of Cognitive Status, Ongoing Assess. of 

Function Status, Bladder Management, Bladder Scan, Bladder Training, Bowel 

Management, Bowel Training, Disease Management & Educaiton, DVT Prophylaxis, 

Fall Prevention, Fluid/Electrolyte/Nutrition Mgmt, Infection Prevention, 

Medication Management & Education, Management of Risks & Complications, 

Management of Skin Intergrity, Nutrition Management, Pain Management, 

Patient/Family Support, Safety Management





Intensity of Therapy to be met


Patient to be seen:  Min.3h per day/5 of 7d





PT IPOC


Problem List:  Activity Tolerance, Functional Strength, Safety, Balance, Gait, 

Transfer, Bed Mobility, ROM


Treatment Plan:  Continue Plan of Care


Bed Mobility, Education, Functional Activity Adal, Functional Strength, Group 

Therapy, Gait, Safety, Therapeutic Exercise, Transfers


Treatment Duration:  Dec 31, 2022


Frequency:  At least 5 of 7 days/Wk (IRF)


Estimated Hrs Per Day:  1.5 hours per day





OT IPOC


Problems:  Decreased Activ Tolerance, Decreased Safety Aware, Decreased UE 

Strength, Impaired Cognition, Impaired Coordination, Impaired Funct Balance, 

Impaired I ADL's, Impaired Self-Care Skills, Restricted Funct UE ROM


OT Treatment, Training and Edu:  Yes


Plan of Care:  ADL Retraining, Caregiver Training, Functional Mobility, Group 

Exercise/Act as Ind, UE Funct Exercise/Act


Treatment Duration:  Nov 10, 2022


Frequency:  At least 5 of 7 days/Wk (IRF)


Estimated Hrs Per Day:  1.5 hours per day (60-90 min/day)





ST IPOC


Speech Therapy Treatment Plan:  Discontinue ST


Treatment Duration:  Oct 20, 2022


Frequency:  1 time per week


Estimated Hrs Per Day:  .5 hour per day





/Case Mgmt


/Case Managemen:  Discharge Planning





Dietitian/Nutritionist


Dietitian/Nutritionist to monitor nutritional status and make changes and/or rec

ommendations as needed and work with speech pathology on dietary upgrades as the

occur.





Physician IPOC


Medical Issues being managed closely and that require the 24 hour availability 

of a physician:





Recent hip replacement and chronic benzo and narcotic dependence with severe 

hypokalemia and severe debility frail status post severe scoliosis will require 

close monitoring from physician supervision to prevent decompensation


Medical Issues:  Bowel/Bladder Function, DVT Prophylaxis, Falls Precautions, 

Fluid/Electrolyte/Nutrition Balance, Infection Protection, Pain Management, 

Wound Care


Brief Synthesis of Preadmission Screen, Post-Admission Evaluation, and Therapy 

Evaluations:





PT and OT will focus on regaining function with use of assistive devices and 

increase stamina with ambulation and help with independence in ADLs in order to 

return back home to independent living


Medical Prognosis:  Fair


Anticipated Length of Stay:  7 days











SHAGGY DIXON DO                Oct 21, 2022 06:16

## 2022-10-21 NOTE — PHYSICAL THERAPY DAILY NOTE
PT Daily Note-Current


Subjective


Patient standing with FWW coming from the bathroom with nursing pre-tx. reports 

severe pain in R hip, agrees to PT.





Pain


Section J - Health Conditions


1. Rarely or not at all


2. Occasionally


3. Frequently


4. Almost constantly


8. Unable to answer


Pain Effect on Sleep:  8


Pain Interference with Therapy:  8


Pain Interference w/Day-to-Day:  8





Appearance


Patient in bed post-tx with tray, nurse call, phone, blanket, and all needs met.





Mental Status


Patient Orientation:  Person, Place, Situation


R Hip Wound Vac





Transfers


SCALE: Activities may be completed with or without assistive devices.





6-Indepedent-patient completes the activity by him/herself with no assistance 

from a helper.


5-Set-up or Clean-up Assistance-helper sets up or cleans up; patient completes 

activity. Tracy assists only prior to or  


    following the activity.


4-Supervision or Touching Assistance-helper provides verbal cues and/or touchi

ng/steadying and/or contact guard assistance as patient completes activity. 

Assistance may be provided   


    throughout the activity or intermittently.


3-Partial/Moderate Assistance-helper does LESS THAN HALF the effort. Tracy 

lifts, holds or supports trunk or limbs, but provides less than half the effort.


2-Substantial/Maximal Assistance-helper does MORE THAN HALF the effort. Tracy 

lifts or holds trunk or limbs and provides more than half the effort.


1-Sfdeqpwge-lalxhd does ALL the effort. Patient does none of the effort to 

complete the activity. Or, the assistance of 2 or more helpers is required for 

the patient to complete the  


    activity.


If activity was not attempted, code reason:


7-Patient Refused.


9-Not Applicable-not attempted and the patient did not perform the activity 

before the current illness, exacerbation or injury.


10-Not Attempted due to Environmental Limitations-(lack of equipment, weather 

restraints, etc.).


88-Not Attempted due to Medical Conditions or Safety Concerns.


Sit to Lying (QC):  3


Sit to Stand (QC):  4


Chair/Bed-to-Chair Xfer(QC):  4


SBA stand to sit, Min A lifting R leg into bed when lying down.





Weight Bearing


Right Lower Extremity:  Right


Weight Bearing/Tolerated


Left Lower Extremity:  Left


Full Weight Bearing





Gait Training


Does the Patient Walk?:  Yes


Distance:  120ft x 2


Walk 10 feet (QC):  4


Walk 50 ft with 2 Turns(QC):  4


Gait Persons Needed:  1


Gait Assistive Device:  FWW


Decreased walking speed, decreased foot clearance, decreased step length.





Treatments


Gait Training





Assessment


Current Status:  Fair Progress


Patient exhibits slow mobility due to hip pain, has increase kyphosis and 

forward head making it difficulty to stand up straight.





PT Long Term Goals


Long Term Goals


PT Long Term Goals Time Frame:  Nov 11, 2022


Roll Left & Right (QC):  6


Sit to Lying (QC):  6


Lying-Sitting on Side/Bed(QC):  6


Sit to Stand (QC):  6


Chair/Bed-to-Chair Xfer(QC):  6


Toilet Transfer (QC):  6


Car Transfer (QC):  6


Does the Patient Walk:  Yes


Walk 10 feet (QC):  6


Walk 50ft with 2 Turns (QC):  4


Walk 150 ft (QC):  4


Walking 10ft on Uneven Surface:  6


1 Step (curb) (QC):  4


4 Steps (QC):  4


12 Steps (QC):  4


Picking up an Object (QC):  4


Does the Pt use WC or Scooter?:  Yes


Wheel 50 feet with 2 turns (QC:  6


Wheel 150 feet:  6





PT Plan


Problem List


Problem List:  Activity Tolerance, Functional Strength, Safety, Balance, Gait, 

Transfer, Bed Mobility, ROM





Treatment/Plan


Treatment Plan:  Continue Plan of Care


Treatment Plan:  Bed Mobility, Education, Functional Activity Adal, Functional 

Strength, Group Therapy, Gait, Safety, Therapeutic Exercise, Transfers


Treatment Duration:  Dec 31, 2022


Frequency:  At least 5 of 7 days/Wk (IRF)


Estimated Hrs Per Day:  1.5 hours per day


Patient and/or Family Agrees t:  Yes





Safety Risks/Education


Patient Education:  Gait Training, Transfer Techniques, Reviewed Precautions, 

Correct Positioning, Safety Issues


Teaching Recipient:  Patient


Teaching Methods:  Demonstration, Discussion


Response to Teaching:  Reinforcement Needed





Time/GCodes


Time In:  1250


Time Out:  1305


Total Billed Treatment Time:  15


Total Billed Treatment


1 visit 


Gait Training 15min











ASMITA PASTRANA PT                Oct 21, 2022 13:22

## 2022-10-21 NOTE — PHYSICIAN QUERY CLARIFICATION
SACHI MTZ 10/21/22 0752:


PQD17 Principal Diagnosis


Principal Diagnosis


Document Diagnosis


QUESTION: Please specify the condition(s) that was chiefly responsible for o

ccasioning the admission to the hospital after study/evaluation based on your 

medical judgment.





Clinical Indicators/Findings


On Admit/Eval/Treatment:  


Patient admitted to IRF s/p Rt THR. Please document the condition


responsible for the Rt. THR





 Note


 Note








In responding to this query, please exercise your independent professional 

judgment.  The purpose of this communication is to more accurately reflect the 

complexity of your patients condition. The fact that a question is asked does 

not imply that any particular answer is desired or expected.  





Thank you for your timely response to this clarification.      


   


Requestors name: Sachi   





Phone # [phone]








THIS PHYSICIAN QUERY FORM IS A PERMANENT PART OF THE MEDICAL RECORD





SHAGGY DIXON DO 10/21/22 1021:


PQD17 Principal Diagnosis


Question


Chief Reason for Admission Aft:  


SACHI Muhammad                 Oct 21, 2022 07:52


SHAGGY DIXON DO                Oct 21, 2022 10:21

## 2022-10-21 NOTE — OCCUPATIONAL THER DAILY NOTE
OT Current Status-Daily Note


Subjective


Pt alert, sitting in recliner.  Pt agrees to therapy though requires 

encouragement to participate at times throughout therapy.  Pt c/o pain though 

does not rate.  Pt continually talked about not having her normal dosage of pain

meds.  Co-treat with PT [id number], skills of 2 clinicians required due to pt's 

increased pain and to decrease fall risk during functional task.  PT focusing on

standing, ambulation and OT focusing on ADLs and functional mobility.





Mental Status/Objective


Patient Orientation:  Person, Place, Time, Situation


Attachments:  Drains (wound vac)





ADL-Treatment


Pt agrees to sponge bath, shower not completed due to no information as to if it

can be completed with wound vac.  Pt takes increased time to complete all tasks 

due to slow movement, hyperverbal about pain meds and pain.  Pt completed 

toileting 2x's this session.  Toilet transfer CGA for stand to sit, independent 

sit to stand.  CGA in standing to manipulate clothing over wound vac tubing.  Pt

independent with toilet hygiene.   Standing at sink, pt is independent with oral

care.  Set up with upper body dressing.  Due to time constraints, pt required 

assistance to complete lower body dressing and footwear.  After therapy, pt left

in care of PT.  All needs met.


Therapy Code Descriptions/Definitions 





Functional Hunt Measure:


0=Not Assessed/NA        4=Minimal Assistance


1=Total Assistance        5=Supervision or Setup


2=Maximal Assistance  6=Modified Hunt


3=Moderate Assistance 7=Complete IndependenceSCALE: Activities may be completed 

with or without assistive devices.





6-Indepedent-patient completes the activity by him/herself with no assistance 

from a helper.


5-Set-up or Clean-up Assistance-helper sets up or cleans up; patient completes 

activity. [place] assists only prior to or  


    following the activity.


4-Supervision or Touching Assistance-helper provides verbal cues and/or 

touching/steadying and/or contact guard assistance as patient completes 

activity. Assistance may be provided   


    throughout the activity or intermittently.


3-Partial/Moderate Assistance-helper does LESS THAN HALF the effort. [place] 

lifts, holds or supports trunk or limbs, but provides less than half the effort.


2-Substantial/Maximal Assistance-helper does MORE THAN HALF the effort. [place] 

lifts or holds trunk or limbs and provides more than half the effort.


1-Ltdltmrbu-gngssg does ALL the effort. Patient does none of the effort to 

complete the activity. Or, the assistance of 2 or more helpers is required for 

the patient to complete the  


    activity.


If activity was not attempted, code reason:


7-Patient Refused.


9-Not Applicable-not attempted and the patient did not perform the activity 

before the current illness, exacerbation or injury.


10-Not Attempted due to Environmental Limitations-(lack of equipment, weather 

restraints, etc.).


88-Not Attempted due to Medical Conditions or Safety Concerns.


Eating (QC):  6


Oral Hygiene (QC):  6


Bathing Location:  L Arm, R Arm, L Upper Leg, R Upper Leg, Chest, Abdomen, 

Buttocks, Perineal Area


Shower/Bathe Self (QC):  3 (Min A after set up for sponge bath, pt completed all

areas except lower legs and feet due to time constraints.  Will give pt LH 

sponge.)


Upper Body Dressing (QC):  5


Toileting Hygiene (QC):  4


Toilet Transfer (QC):  4





Education


OT Patient Education:  Purpose of tx/functional activities, Reviewed 

precautions, Rehab process


Teaching Recipient:  Patient


Teaching Methods:  Demonstration, Discussion


Response to Teaching:  Verbalize Understanding, Return Demonstration, 

Reinforcement Needed





OT Short Term Goals


Short Term Goals


Time Frame:  Oct 31, 2022


Eatin


Oral hygiene:  4


Toileting hygiene:  3


Shower/bathe self:  3


Upper body dressin


Lower body dressin


Putting on/taking off footwear:  3





OT Long Term Goals


Long Term Goals


Time Frame:  Nov 10, 2022


Acute change in mental status:  0


Inattention:  0


Disorganized thinkin


Altered level of consciousness:  0


Eating (QC):  6


Oral Hygiene (QC):  5


Toileting Hygiene (QC):  6


Shower/Bathe Self (QC):  5


Upper Body Dressing (QC):  6


Lower Body Dressing (QC):  5


On/Off Footwear (QC):  6


Additional Goals:  1-Demonstrate ADL Tasks, 2-Verbalize Understanding, 3-

ImproveStrength/Adal


1=Demonstrate adherence to instructed precautions during ADL tasks.


2=Patient will verbalize/demonstrate understanding of assistive 

devices/modifications for ADL.


3=Patient will improve strength/tolerance for activity to enable patient to 

perform ADL's.





OT Education/Plan


Problem List/Assessment


Assessment:  Decreased Activ Tolerance, Decreased UE Strength, Impaired Self-

Care Skills





Discharge Recommendations


Plan/Recommendations:  Continue POC





Treatment Plan/Plan of Care


Patient would benefit from OT for education, treatment and training to promote 

independence in ADL's, mobility, safety and/or upper extremity function for 

ADL's.


Plan of Care:  ADL Retraining, Caregiver Training, Functional Mobility, Group 

Exercise/Act as Ind, UE Funct Exercise/Act


Treatment Duration:  Nov 10, 2022


Frequency:  At least 5 of 7 days/Wk (IRF)


Estimated Hrs Per Day:  1.5 hours per day (60-90 min/day)


Agreement:  Yes


Rehab Potential:  Guarded





Time/GCodes


Start Time:  08:30


Stop Time:  10:15


Total Time Billed (hr/min):  105


Billed Treatment Time


1 visit-ADL 7 (105 min)  Co-treat with PT [id number], individual [id number]











LITO HONG               Oct 21, 2022 10:29

## 2022-10-22 VITALS — DIASTOLIC BLOOD PRESSURE: 72 MMHG | SYSTOLIC BLOOD PRESSURE: 121 MMHG

## 2022-10-22 VITALS — SYSTOLIC BLOOD PRESSURE: 103 MMHG | DIASTOLIC BLOOD PRESSURE: 62 MMHG

## 2022-10-22 RX ADMIN — POTASSIUM CHLORIDE SCH MEQ: 1500 TABLET, EXTENDED RELEASE ORAL at 09:01

## 2022-10-22 RX ADMIN — ACETAMINOPHEN SCH MG: 325 TABLET ORAL at 22:11

## 2022-10-22 RX ADMIN — DOCUSATE SODIUM AND SENNOSIDES SCH EA: 8.6; 5 TABLET, FILM COATED ORAL at 09:12

## 2022-10-22 RX ADMIN — DOCUSATE SODIUM SCH MG: 100 CAPSULE ORAL at 21:44

## 2022-10-22 RX ADMIN — POTASSIUM CHLORIDE SCH MEQ: 1500 TABLET, EXTENDED RELEASE ORAL at 12:52

## 2022-10-22 RX ADMIN — GABAPENTIN SCH MG: 600 TABLET, FILM COATED ORAL at 12:52

## 2022-10-22 RX ADMIN — POLYETHYLENE GLYCOL (3350) SCH GM: 17 POWDER, FOR SOLUTION ORAL at 21:44

## 2022-10-22 RX ADMIN — ACETAMINOPHEN SCH MG: 325 TABLET ORAL at 05:57

## 2022-10-22 RX ADMIN — TOPIRAMATE SCH MG: 100 TABLET, FILM COATED ORAL at 21:43

## 2022-10-22 RX ADMIN — PANTOPRAZOLE SODIUM SCH MG: 40 TABLET, DELAYED RELEASE ORAL at 09:01

## 2022-10-22 RX ADMIN — PROMETHAZINE HYDROCHLORIDE PRN MG: 25 TABLET ORAL at 21:41

## 2022-10-22 RX ADMIN — Medication SCH EA: at 07:41

## 2022-10-22 RX ADMIN — SUMATRIPTAN SUCCINATE PRN MG: 50 TABLET, FILM COATED ORAL at 21:42

## 2022-10-22 RX ADMIN — LORATADINE SCH MG: 10 TABLET ORAL at 17:09

## 2022-10-22 RX ADMIN — TOPIRAMATE SCH MG: 100 TABLET, FILM COATED ORAL at 09:01

## 2022-10-22 RX ADMIN — GABAPENTIN SCH MG: 600 TABLET, FILM COATED ORAL at 09:01

## 2022-10-22 RX ADMIN — Medication SCH MCG: at 09:01

## 2022-10-22 RX ADMIN — GABAPENTIN SCH MG: 600 TABLET, FILM COATED ORAL at 21:42

## 2022-10-22 RX ADMIN — GABAPENTIN SCH MG: 600 TABLET, FILM COATED ORAL at 17:09

## 2022-10-22 RX ADMIN — TRAZODONE HYDROCHLORIDE SCH MG: 50 TABLET ORAL at 21:43

## 2022-10-22 RX ADMIN — MONTELUKAST SCH MG: 10 TABLET, FILM COATED ORAL at 09:01

## 2022-10-22 RX ADMIN — ACETAMINOPHEN SCH MG: 325 TABLET ORAL at 14:11

## 2022-10-22 RX ADMIN — POLYETHYLENE GLYCOL (3350) SCH GM: 17 POWDER, FOR SOLUTION ORAL at 09:12

## 2022-10-22 RX ADMIN — DOCUSATE SODIUM AND SENNOSIDES SCH EA: 8.6; 5 TABLET, FILM COATED ORAL at 21:44

## 2022-10-22 RX ADMIN — POTASSIUM CHLORIDE SCH MEQ: 1500 TABLET, EXTENDED RELEASE ORAL at 17:09

## 2022-10-22 RX ADMIN — ZOLPIDEM TARTRATE PRN MG: 5 TABLET ORAL at 21:43

## 2022-10-22 NOTE — PHYSICAL THERAPY DAILY NOTE
PT Daily Note-Current


Subjective


Pt sitting up in bed upon arrival.  Pt reports needing to use BR.  Pt agrees to 

PT.  Pt continues to be verbal about not getting enough pain med. as pt gets at 

home.  Pt reports telling Dr Ramos but not receiving anymore pain meds.  Pt has

history of drug abuse.





Pain





   Location:  Right


   Location Body Site:  Hip


   Pain Description:  Throbbing, Tightness


   Comment:  Reported but not rated


Section J - Health Conditions


1. Rarely or not at all


2. Occasionally


3. Frequently


4. Almost constantly


8. Unable to answer


Pain Effect on Sleep:  8


Pain Interference with Therapy:  8


Pain Interference w/Day-to-Day:  8





Mental Status


Patient Orientation:  Person, Place





Transfers


SCALE: Activities may be completed with or without assistive devices.





6-Indepedent-patient completes the activity by him/herself with no assistance 

from a helper.


5-Set-up or Clean-up Assistance-helper sets up or cleans up; patient completes 

activity. [place] assists only prior to or  


    following the activity.


4-Supervision or Touching Assistance-helper provides verbal cues and/or 

touching/steadying and/or contact guard assistance as patient completes 

activity. Assistance may be provided   


    throughout the activity or intermittently.


3-Partial/Moderate Assistance-helper does LESS THAN HALF the effort. [place]res 

lifts, holds or supports trunk or limbs, but provides less than half the effort.


2-Substantial/Maximal Assistance-helper does MORE THAN HALF the effort. [place]res 

lifts or holds trunk or limbs and provides more than half the effort.


4-Qeyqwdigu-ylnfkc does ALL the effort. Patient does none of the effort to 

complete the activity. Or, the assistance of 2 or more helpers is required for 

the patient to complete the  


    activity.


If activity was not attempted, code reason:


7-Patient Refused.


9-Not Applicable-not attempted and the patient did not perform the activity 

before the current illness, exacerbation or injury.


10-Not Attempted due to Environmental Limitations-(lack of equipment, weather 

restraints, etc.).


88-Not Attempted due to Medical Conditions or Safety Concerns.


Sit to Stand (QC):  4


Toilet Transfer (QC):  4





Weight Bearing


Right Lower Extremity:  Right


Weight Bearing/Tolerated


Left Lower Extremity:  Left


Full Weight Bearing





Gait Training


Does the Patient Walk?:  Yes


Distance:  20', 10'


Walk 10 feet (QC):  4


Gait Persons Needed:  1


Gait Assistive Device:  FWW





Treatments


TF from Supine to EOB w/VC to hook good leg under bad one to aid in TF.  TF to 

standing and amb. to BR.  After toileting, pt completes pericare.  Pt continues 

to be hyperverbal about pain meds and not getting enough.  VC for maintaining 

Hip Precautions.  Pt returns to recliner as voices R hip is throbbing.  

Positioned to comfort and rest at end of tx.  All needs met, call light in hand.





Assessment


Current Status:  Fair Progress


VC for safety and Hip Precautions.  Pt focused on pain meds throughout tx, no 

redirection possible.





PT Long Term Goals


Long Term Goals


PT Long Term Goals Time Frame:  Nov 11, 2022


Roll Left & Right (QC):  6


Sit to Lying (QC):  6


Lying-Sitting on Side/Bed(QC):  6


Sit to Stand (QC):  6


Chair/Bed-to-Chair Xfer(QC):  6


Toilet Transfer (QC):  6


Car Transfer (QC):  6


Does the Patient Walk:  Yes


Walk 10 feet (QC):  6


Walk 50ft with 2 Turns (QC):  4


Walk 150 ft (QC):  4


Walking 10ft on Uneven Surface:  6


1 Step (curb) (QC):  4


4 Steps (QC):  4


12 Steps (QC):  4


Picking up an Object (QC):  4


Does the Pt use WC or Scooter?:  Yes


Wheel 50 feet with 2 turns (QC:  6


Wheel 150 feet:  6





PT Plan


Problem List


Problem List:  Activity Tolerance, Functional Strength, Safety, Transfer





Treatment/Plan


Treatment Plan:  Continue Plan of Care


Treatment Plan:  Bed Mobility, Education, Functional Activity Adal, Functional 

Strength, Group Therapy, Gait, Safety, Therapeutic Exercise, Transfers


Treatment Duration:  Dec 31, 2022


Frequency:  At least 5 of 7 days/Wk (IRF)


Estimated Hrs Per Day:  1.5 hours per day


Patient and/or Family Agrees t:  Yes





Safety Risks/Education


Patient Education:  Gait Training, Transfer Techniques, Reviewed Precautions, 

Correct Positioning, Safety Issues


Teaching Recipient:  Patient


Teaching Methods:  Discussion


Response to Teaching:  Reinforcement Needed





Time/GCodes


Time In:  815


Time Out:  845


Total Billed Treatment Time:  30


Total Billed Treatment


1, FA x2 (30m)











CLEVE HOOKER PTA              Oct 22, 2022 10:03

## 2022-10-22 NOTE — PM&R PROGRESS NOTE
Subjective


HPI/CC On Admission


Date Seen by Provider:  Oct 22, 2022


Time Seen by Provider:  12:30


Subjective/Events-last exam


10/22/2022:


Patient doing pretty well


Walking pretty well


Voiding well


Loose stools yesterday


Pain is still an issue








10/21/2022:


Patient medically stable


Replacing potassium


All she wants to talk about is her pain medication and the fact she takes more 

at home than she does here


Family and other doctors who have treated her all want to minimize pain 

medication and I informed her we will do that


Bowels are moving


Checked meds and labs





Review of Systems


General:  Fatigue, Malaise





Objective


Exam


Vital Signs





Vital Signs








  Date Time  Temp Pulse Resp B/P (MAP) Pulse Ox O2 Delivery O2 Flow Rate FiO2


 


10/22/22 19:58 36.7 75 16 121/72 (88) 96 Room Air  





Capillary Refill :


General Appearance:  No Apparent Distress, WD/WN, Chronically ill, Thin, Other 

(Frail)


HEENT:  PERRL/EOMI, Normal ENT Inspection, Pharynx Normal


Neck:  Full Range of Motion, Normal Inspection, Non Tender, Supple, Carotid 

Bruit


Respiratory:  Chest Non Tender, Lungs Clear, Normal Breath Sounds, No Accessory 

Muscle Use, No Respiratory Distress


Cardiovascular:  Regular Rate, Rhythm, No Edema, No Gallop, No JVD, No Murmur, 

Normal Peripheral Pulses


Gastrointestinal:  Normal Bowel Sounds, No Organomegaly, No Pulsatile Mass, Non 

Tender, Soft


Back:  Normal Inspection, No CVA Tenderness, No Vertebral Tenderness


Extremity:  Normal Capillary Refill, Normal Inspection, Normal Range of Motion 

(Except for right leg), Non Tender, No Calf Tenderness, No Pedal Edema


Neurologic/Psychiatric:  Alert, Oriented x3, Normal Mood/Affect, CNs II-XII Norm

as Tested, Abnormal Gait, Motor Weakness (Right leg weakness)


Skin:  Normal Color, Warm/Dry


Lymphatic:  No Adenopathy





Results/Procedures


Lab


Patient resulted labs reviewed.





FIM


Transfers


Therapy Code Descriptions/Definitions 





Functional [place] Measure:


0=Not Assessed/NA        4=Minimal Assistance


1=Total Assistance        5=Supervision or Setup


2=Maximal Assistance  6=Modified [place]


3=Moderate Assistance 7=Complete IndependenceSCALE: Activities may be completed 

with or without assistive devices.





6-Indepedent-patient completes the activity by him/herself with no assistance 

from a helper.


5-Set-up or Clean-up Assistance-helper sets up or cleans up; patient completes 

activity. [place] assists only prior to or  


    following the activity.


4-Supervision or Touching Assistance-helper provides verbal cues and/or 

touching/steadying and/or contact guard assistance as patient completes 

activity. Assistance may be provided   


    throughout the activity or intermittently.


3-Partial/Moderate Assistance-helper does LESS THAN HALF the effort. [place] 

lifts, holds or supports trunk or limbs, but provides less than half the effort.


2-Substantial/Maximal Assistance-helper does MORE THAN HALF the effort. [place] 

lifts or holds trunk or limbs and provides more than half the effort.


3-[id number] does ALL the effort. Patient does none of the effort to 

complete the activity. Or, the assistance of 2 or more helpers is required for 

the patient to complete the  


    activity.


If activity was not attempted, code reason:


7-Patient Refused.


9-Not Applicable-not attempted and the patient did not perform the activity 

before the current illness, exacerbation or injury.


10-Not Attempted due to Environmental Limitations-(lack of equipment, weather 

restraints, etc.).


88-Not Attempted due to Medical Conditions or Safety Concerns.


Roll Left to Right (QC):  3


Sit to Lying (QC):  3


Sit to Stand (QC):  4


Chair/Bed-to-Chair Xfer(QC):  4


Car Transfer (QC):  2





Gait Training


Does the Patient Walk?:  Yes


Distance:  120ft x 2


Walk 10 feet (QC):  4


Walk 50 ft with 2 Turns(QC):  4


Walk 150 ft (QC):  88


Walking 10ft/uneven surface-QC:  88


Gait Persons Needed:  1


Gait Assistive Device:  FWW





Wheelchair Training


Does the Pt Use a Wheelchair?:  Yes


Distance:  20


Wheel 50 ft with 2 turns (QC):  3


Wheel 150 ft (QC):  88


Type of Wheelchair:  Manual





Stair Training


 Stair Training: Handrails/:  uses walker


#of Steps:  1


1 Step (curb) (QC):  3


4 Steps (QC):  88


12 Steps (QC):  88


Stairs:  Pattern:  Step to





Balance


Picking up an Object (QC):  4 (CGA using a reacher)





ADL-Treatment


Eating (QC):  6


Oral Hygiene (QC):  6


Bathing Location:  L Arm, R Arm, L Upper Leg, R Upper Leg, Chest, Abdomen, 

Buttocks, Perineal Area


Shower/Bathe Self (QC):  3 (Min A after set up for sponge bath, pt completed all

areas except lower legs and feet due to time constraints.  Will give pt LH 

sponge.)


Upper Body Dressing (QC):  5


Lower Body Dressing (QC):  3


On/Off Footwear (QC):  1


Toileting Hygiene (QC):  4


Toilet Transfer (QC):  4





Assessment/Plan


Assessment and Plan


Assess & Plan/Chief Complaint


Assessment:


Right hip replacement by Dr. Brown uncomplicated


Slow recovery


Chronic debility


Chronic pain syndrome


Narcotic and benzodiazepine dependence


Severe scoliosis in need of extensive surgery


Fibromyalgia


Severe hypokalemia





Plan:


Supportive care


Monitor closely


Pain control but minimize medication


Aggressive PT and OT


Monitor potassium





10/21/2022:


Minimize pain meds


Replace potassium





10/22/2022:


Supplemental potassium





(1) Generalized weakness


Status:  Acute


(2) S/P hip replacement











SHAGGY DIXON DO                Oct 22, 2022 05:51

## 2022-10-23 VITALS — SYSTOLIC BLOOD PRESSURE: 123 MMHG | DIASTOLIC BLOOD PRESSURE: 70 MMHG

## 2022-10-23 VITALS — DIASTOLIC BLOOD PRESSURE: 55 MMHG | SYSTOLIC BLOOD PRESSURE: 103 MMHG

## 2022-10-23 RX ADMIN — POTASSIUM CHLORIDE SCH MEQ: 1500 TABLET, EXTENDED RELEASE ORAL at 09:10

## 2022-10-23 RX ADMIN — POTASSIUM CHLORIDE SCH MEQ: 1500 TABLET, EXTENDED RELEASE ORAL at 18:48

## 2022-10-23 RX ADMIN — SUMATRIPTAN SUCCINATE PRN MG: 50 TABLET, FILM COATED ORAL at 20:14

## 2022-10-23 RX ADMIN — POLYETHYLENE GLYCOL (3350) SCH GM: 17 POWDER, FOR SOLUTION ORAL at 20:15

## 2022-10-23 RX ADMIN — CLONAZEPAM PRN MG: 0.5 TABLET ORAL at 20:08

## 2022-10-23 RX ADMIN — GABAPENTIN SCH MG: 600 TABLET, FILM COATED ORAL at 09:10

## 2022-10-23 RX ADMIN — ACETAMINOPHEN SCH MG: 325 TABLET ORAL at 21:55

## 2022-10-23 RX ADMIN — GABAPENTIN SCH MG: 600 TABLET, FILM COATED ORAL at 13:40

## 2022-10-23 RX ADMIN — POTASSIUM CHLORIDE SCH MEQ: 1500 TABLET, EXTENDED RELEASE ORAL at 13:40

## 2022-10-23 RX ADMIN — TOPIRAMATE SCH MG: 100 TABLET, FILM COATED ORAL at 09:10

## 2022-10-23 RX ADMIN — PROMETHAZINE HYDROCHLORIDE PRN MG: 25 TABLET ORAL at 16:22

## 2022-10-23 RX ADMIN — Medication SCH MCG: at 09:10

## 2022-10-23 RX ADMIN — ZOLPIDEM TARTRATE PRN MG: 5 TABLET ORAL at 21:54

## 2022-10-23 RX ADMIN — TRAZODONE HYDROCHLORIDE SCH MG: 50 TABLET ORAL at 21:53

## 2022-10-23 RX ADMIN — SUMATRIPTAN SUCCINATE PRN MG: 50 TABLET, FILM COATED ORAL at 13:40

## 2022-10-23 RX ADMIN — PANTOPRAZOLE SODIUM SCH MG: 40 TABLET, DELAYED RELEASE ORAL at 09:10

## 2022-10-23 RX ADMIN — DOCUSATE SODIUM AND SENNOSIDES SCH EA: 8.6; 5 TABLET, FILM COATED ORAL at 09:10

## 2022-10-23 RX ADMIN — Medication SCH EA: at 07:32

## 2022-10-23 RX ADMIN — LORATADINE SCH MG: 10 TABLET ORAL at 18:48

## 2022-10-23 RX ADMIN — DOCUSATE SODIUM SCH MG: 100 CAPSULE ORAL at 20:15

## 2022-10-23 RX ADMIN — TOPIRAMATE SCH MG: 100 TABLET, FILM COATED ORAL at 20:14

## 2022-10-23 RX ADMIN — ACETAMINOPHEN SCH MG: 325 TABLET ORAL at 06:05

## 2022-10-23 RX ADMIN — MONTELUKAST SCH MG: 10 TABLET, FILM COATED ORAL at 09:10

## 2022-10-23 RX ADMIN — DOCUSATE SODIUM AND SENNOSIDES SCH EA: 8.6; 5 TABLET, FILM COATED ORAL at 20:15

## 2022-10-23 RX ADMIN — GABAPENTIN SCH MG: 600 TABLET, FILM COATED ORAL at 18:48

## 2022-10-23 RX ADMIN — ACETAMINOPHEN SCH MG: 325 TABLET ORAL at 13:41

## 2022-10-23 RX ADMIN — POLYETHYLENE GLYCOL (3350) SCH GM: 17 POWDER, FOR SOLUTION ORAL at 09:17

## 2022-10-23 RX ADMIN — GABAPENTIN SCH MG: 600 TABLET, FILM COATED ORAL at 21:54

## 2022-10-23 NOTE — PM&R PROGRESS NOTE
Subjective


HPI/CC On Admission


Date Seen by Provider:  Oct 23, 2022


Time Seen by Provider:  13:00


Subjective/Events-last exam


10/23/2022:


Patient about the same


Complaints about chronic pain


Reports she is getting headaches and migraines due to not enough pain medicine 

and patient appears to be in no distress at all











10/22/2022:


Patient doing pretty well


Walking pretty well


Voiding well


Loose stools yesterday


Pain is still an issue








10/21/2022:


Patient medically stable


Replacing potassium


All she wants to talk about is her pain medication and the fact she takes more 

at home than she does here


Family and other doctors who have treated her all want to minimize pain medicat

ion and I informed her we will do that


Bowels are moving


Checked meds and labs





Review of Systems


General:  Fatigue, Malaise


Musculoskeletal:  leg pain, foot pain





Objective


Exam


Vital Signs





Vital Signs








  Date Time  Temp Pulse Resp B/P (MAP) Pulse Ox O2 Delivery O2 Flow Rate FiO2


 


10/23/22 09:00      Room Air  


 


10/23/22 08:00 36.2 75 18 103/55 (71 94   





Capillary Refill :


General Appearance:  No Apparent Distress, WD/WN, Chronically ill, Thin, Other 

(Frail)


HEENT:  PERRL/EOMI, Normal ENT Inspection, Pharynx Normal


Neck:  Full Range of Motion, Normal Inspection, Non Tender, Supple, Carotid 

Bruit


Respiratory:  Chest Non Tender, Lungs Clear, Normal Breath Sounds, No Accessory 

Muscle Use, No Respiratory Distress


Cardiovascular:  Regular Rate, Rhythm, No Edema, No Gallop, No JVD, No Murmur, 

Normal Peripheral Pulses


Gastrointestinal:  Normal Bowel Sounds, No Organomegaly, No Pulsatile Mass, Non 

Tender, Soft


Back:  Normal Inspection, No CVA Tenderness, No Vertebral Tenderness


Extremity:  Normal Capillary Refill, Normal Inspection, Normal Range of Motion 

(Except for right leg), Non Tender, No Calf Tenderness, No Pedal Edema


Neurologic/Psychiatric:  Alert, Oriented x3, Normal Mood/Affect, CNs II-XII Norm

as Tested, Abnormal Gait, Motor Weakness (Right leg weakness)


Skin:  Normal Color, Warm/Dry


Lymphatic:  No Adenopathy





Results/Procedures


Lab


Patient resulted labs reviewed.





FIM


Transfers


Therapy Code Descriptions/Definitions 





Functional [place] Measure:


0=Not Assessed/NA        4=Minimal Assistance


1=Total Assistance        5=Supervision or Setup


2=Maximal Assistance  6=Modified [place]


3=Moderate Assistance 7=Complete IndependenceSCALE: Activities may be completed 

with or without assistive devices.





6-Indepedent-patient completes the activity by him/herself with no assistance 

from a helper.


5-Set-up or Clean-up Assistance-helper sets up or cleans up; patient completes 

activity. [place] assists only prior to or  


    following the activity.


4-Supervision or Touching Assistance-helper provides verbal cues and/or 

touching/steadying and/or contact guard assistance as patient completes 

activity. Assistance may be provided   


    throughout the activity or intermittently.


3-Partial/Moderate Assistance-helper does LESS THAN HALF the effort. [place] 

lifts, holds or supports trunk or limbs, but provides less than half the effort.


2-Substantial/Maximal Assistance-helper does MORE THAN HALF the effort. [place] 

lifts or holds trunk or limbs and provides more than half the effort.


1-[id number]-[id number] does ALL the effort. Patient does none of the effort to 

complete the activity. Or, the assistance of 2 or more helpers is required for 

the patient to complete the  


    activity.


If activity was not attempted, code reason:


7-Patient Refused.


9-Not Applicable-not attempted and the patient did not perform the activity 

before the current illness, exacerbation or injury.


10-Not Attempted due to Environmental Limitations-(lack of equipment, weather 

restraints, etc.).


88-Not Attempted due to Medical Conditions or Safety Concerns.


Roll Left to Right (QC):  3


Sit to Lying (QC):  3


Sit to Stand (QC):  4


Chair/Bed-to-Chair Xfer(QC):  4


Car Transfer (QC):  2





Gait Training


Does the Patient Walk?:  Yes


Distance:  20', 10'


Walk 10 feet (QC):  4


Walk 50 ft with 2 Turns(QC):  4


Walk 150 ft (QC):  88


Walking 10ft/uneven surface-QC:  88


Gait Persons Needed:  1


Gait Assistive Device:  FWW





Wheelchair Training


Does the Pt Use a Wheelchair?:  Yes


Distance:  20


Wheel 50 ft with 2 turns (QC):  3


Wheel 150 ft (QC):  88


Type of Wheelchair:  Manual





Stair Training


 Stair Training: Handrails/:  uses walker


#of Steps:  1


1 Step (curb) (QC):  3


4 Steps (QC):  88


12 Steps (QC):  88


Stairs:  Pattern:  Step to





Balance


Picking up an Object (QC):  4 (CGA using a reacher)





ADL-Treatment


Eating (QC):  6


Oral Hygiene (QC):  6


Bathing Location:  L Arm, R Arm, L Upper Leg, R Upper Leg, Chest, Abdomen, 

Buttocks, Perineal Area


Shower/Bathe Self (QC):  3 (Min A after set up for sponge bath, pt completed all

areas except lower legs and feet due to time constraints.  Will give pt LH 

sponge.)


Upper Body Dressing (QC):  5


Lower Body Dressing (QC):  3


On/Off Footwear (QC):  1


Toileting Hygiene (QC):  4


Toilet Transfer (QC):  4





Assessment/Plan


Assessment and Plan


Assess & Plan/Chief Complaint


Assessment:


Right hip replacement by Dr. Brown uncomplicated


Slow recovery


Chronic debility


Chronic pain syndrome


Narcotic and benzodiazepine dependence


Severe scoliosis in need of extensive surgery


Fibromyalgia


Severe hypokalemia





Plan:


Supportive care


Monitor closely


Pain control but minimize medication


Aggressive PT and OT


Monitor potassium





10/21/2022:


Minimize pain meds


Replace potassium





10/22/2022:


Supplemental potassium





10/23/2022:


Supportive care





(1) Generalized weakness


Status:  Acute


(2) S/P hip replacement











SHAGGY DIXON DO                Oct 23, 2022 05:39

## 2022-10-24 VITALS — SYSTOLIC BLOOD PRESSURE: 101 MMHG | DIASTOLIC BLOOD PRESSURE: 51 MMHG

## 2022-10-24 VITALS — SYSTOLIC BLOOD PRESSURE: 110 MMHG | DIASTOLIC BLOOD PRESSURE: 75 MMHG

## 2022-10-24 VITALS — DIASTOLIC BLOOD PRESSURE: 75 MMHG | SYSTOLIC BLOOD PRESSURE: 110 MMHG

## 2022-10-24 LAB
ALBUMIN SERPL-MCNC: 3.2 GM/DL (ref 3.2–4.5)
ALP SERPL-CCNC: 77 U/L (ref 40–136)
ALT SERPL-CCNC: 12 U/L (ref 0–55)
BASOPHILS # BLD AUTO: 0.1 10^3/UL (ref 0–0.1)
BASOPHILS NFR BLD AUTO: 1 % (ref 0–10)
BILIRUB SERPL-MCNC: 0.5 MG/DL (ref 0.1–1)
BUN/CREAT SERPL: 13
CALCIUM SERPL-MCNC: 8.7 MG/DL (ref 8.5–10.1)
CHLORIDE SERPL-SCNC: 115 MMOL/L (ref 98–107)
CO2 SERPL-SCNC: 17 MMOL/L (ref 21–32)
CREAT SERPL-MCNC: 0.83 MG/DL (ref 0.6–1.3)
EOSINOPHIL # BLD AUTO: 0.5 10^3/UL (ref 0–0.3)
EOSINOPHIL NFR BLD AUTO: 7 % (ref 0–10)
GFR SERPLBLD BASED ON 1.73 SQ M-ARVRAT: 77 ML/MIN
GLUCOSE SERPL-MCNC: 92 MG/DL (ref 70–105)
HCT VFR BLD CALC: 33 % (ref 35–52)
HGB BLD-MCNC: 10.7 G/DL (ref 11.5–16)
LYMPHOCYTES # BLD AUTO: 1.8 10^3/UL (ref 1–4)
LYMPHOCYTES NFR BLD AUTO: 25 % (ref 12–44)
MANUAL DIFFERENTIAL PERFORMED BLD QL: NO
MCH RBC QN AUTO: 33 PG (ref 25–34)
MCHC RBC AUTO-ENTMCNC: 32 G/DL (ref 32–36)
MCV RBC AUTO: 103 FL (ref 80–99)
MONOCYTES # BLD AUTO: 0.4 10^3/UL (ref 0–1)
MONOCYTES NFR BLD AUTO: 5 % (ref 0–12)
NEUTROPHILS # BLD AUTO: 4.4 10^3/UL (ref 1.8–7.8)
NEUTROPHILS NFR BLD AUTO: 61 % (ref 42–75)
PLATELET # BLD: 397 10^3/UL (ref 130–400)
PMV BLD AUTO: 9.1 FL (ref 9–12.2)
POTASSIUM SERPL-SCNC: 4.4 MMOL/L (ref 3.6–5)
PROT SERPL-MCNC: 6 GM/DL (ref 6.4–8.2)
SODIUM SERPL-SCNC: 140 MMOL/L (ref 135–145)
WBC # BLD AUTO: 7.3 10^3/UL (ref 4.3–11)

## 2022-10-24 RX ADMIN — POTASSIUM CHLORIDE SCH MEQ: 1500 TABLET, EXTENDED RELEASE ORAL at 09:32

## 2022-10-24 RX ADMIN — CLONAZEPAM PRN MG: 0.5 TABLET ORAL at 05:30

## 2022-10-24 RX ADMIN — DOCUSATE SODIUM AND SENNOSIDES SCH EA: 8.6; 5 TABLET, FILM COATED ORAL at 09:38

## 2022-10-24 RX ADMIN — MONTELUKAST SCH MG: 10 TABLET, FILM COATED ORAL at 09:32

## 2022-10-24 RX ADMIN — ACETAMINOPHEN SCH MG: 325 TABLET ORAL at 13:21

## 2022-10-24 RX ADMIN — GABAPENTIN SCH MG: 600 TABLET, FILM COATED ORAL at 13:21

## 2022-10-24 RX ADMIN — TOPIRAMATE SCH MG: 100 TABLET, FILM COATED ORAL at 09:32

## 2022-10-24 RX ADMIN — PANTOPRAZOLE SODIUM SCH MG: 40 TABLET, DELAYED RELEASE ORAL at 09:32

## 2022-10-24 RX ADMIN — ACETAMINOPHEN SCH MG: 325 TABLET ORAL at 05:52

## 2022-10-24 RX ADMIN — GABAPENTIN SCH MG: 600 TABLET, FILM COATED ORAL at 09:32

## 2022-10-24 RX ADMIN — SUMATRIPTAN SUCCINATE PRN MG: 50 TABLET, FILM COATED ORAL at 14:11

## 2022-10-24 RX ADMIN — Medication SCH MCG: at 09:32

## 2022-10-24 RX ADMIN — POLYETHYLENE GLYCOL (3350) SCH GM: 17 POWDER, FOR SOLUTION ORAL at 09:38

## 2022-10-24 RX ADMIN — PROMETHAZINE HYDROCHLORIDE PRN MG: 25 TABLET ORAL at 09:32

## 2022-10-24 RX ADMIN — Medication SCH EA: at 07:25

## 2022-10-24 NOTE — THERAPY TEAM DISCHARGE SUMMARY
Therapy Discharge Summary


Discharge Recommendations


Date of Discharge








Physical Therapy


Patient came to rehab S/P Total Rt hip arthroplasty.  Upon evaluation patient 

performed rolling and supine <-> sit min assist, sit <-> stand min assist, 

transfers CGA, car transfer max assist, ambulated 15' with a rolling walker with

CGA, propelled a manual WC 50' with min assist, and went up and down 1 step 

using a rolling walker with min assist.  Patient  has been performing bed 

mobility and transfer training, balance and endurance training, functional 

strengthening, stair training, gait training, and education.  Patient has made 

some progress but has not met her long term goals for sit to lying, car coyle

sfers, stairs, and picking up an object from the floor.  Now, patient performs 

rolling with independence, supine to sit independent, sit to supine setup, sit 

<-> stand and transfers independent, car transfer setup, ambulates 500' with a 

rolling walker with independence (including 50' with at least 2 turns of 90 

degrees and 10' over an uneven surface), patient refused to attempt stairs or 

picking up an object from the floor.  Patient is being discharged from this 

facility today and will be discharged from PT at this time.


Roll Left to Right (QC):  6


Sit to Lying (QC):  5


Lying to Sitting/Side of Bed(Q:  6


Sit to Stand (QC):  6


Chair/Bed-to-Chair Xfer(QC):  6


Toilet Transfer (QC):  6


Car Transfer (QC):  5


Does the Patient Walk:  Yes


Mode of Locomotion:  Walk


Anticipated Mode of Locomotion:  Walk


Walk 10 feet (QC):  6


Walk 50 ft with 2 Turns(QC):  6


Walk 150 ft (QC):  6


Walking 10ft on uneven surface:  6


Distance:  500


Gait Assistive Device:  FWW


Does the Pt Use a Wheelchair:  No


Wheelchair Distance:  20


Wheel 50 ft with 2 turns (QC):  3


Wheel 150 ft (QC):  88


Type of Wheelchair:  Manual


#of Steps:  1


1 Step (curb) (QC):  88


4 Steps (QC):  88


12 Steps (QC):  88


Balance Sitting Static:  Fair


Balance Sitting Dynamic:  Fair


Balance-Standing Static:  Poor


Picking up an Object (QC):  88





Occupational Therapy


Decreased Activ Tolerance, Impaired Self-Care Skills


Eating (QC):  6


Oral Hygiene (QC):  6


Shower/Bathe Self (QC):  3


Upper Body Dressing (QC):  5


Lower Body Dressing (QC):  3


On/Off Footwear (QC):  2


Toileting Hygiene (QC):  6





PT Long Term Goals


Long Term Goals


PT Long Term Goals Time Frame:  2022


PT OT Pain Eval :  


   Comment:  Reported but not rated


Scoring


Section J - Health Conditions


1. Rarely or not at all


2. Occasionally


3. Frequently


4. Almost constantly


8. Unable to answer


Roll Left to Right (QC):  6


Sit to Lying (QC):  6


Lying-Sitting on Side/Bed(QC):  6


Sit to Stand (QC):  6


Chair/Bed-to-Chair Xfer(QC):  6


Toilet/Commode Transfer (QC):  6


Car Transfer (QC):  6


Does the Patient Walk:  Yes


Walk 10 feet (QC):  6


Walk 10ft-Uneven Surface(QC):  6


Walk 50ft with 2 Turns (QC):  4


Walk 150 ft (QC):  4


Gait Assistive Device:  FWW


Does the Pt use WC or Scooter?:  Yes


Wheel 50 feet with 2 turns (QC:  6


1 Step (curb) (QC):  4


4 Steps (QC):  4


12 Steps (QC):  4


Picking up an Object (QC):  4





OT Long Term Goals


Long Term Goals


Time Frame:  Nov 10, 2022


Acute change in mental status:  0


Inattention:  0


Disorganized thinkin


Altered level of consciousness:  0


Eating (QC):  6 (met)


Oral Hygiene (QC):  5 (met)


Toileting Hygiene (QC):  6 (met)


Shower/Bathe Self (QC):  5 (not met)


Upper Body Dressing (QC):  6 (not met)


Lower Body Dressing (QC):  5 (not met)


On/Off Footwear (QC):  6 (not met)


Additional Goals:  1-Demonstrate ADL Tasks, 2-Verbalize Understanding, 3-

ImproveStrength/Adal


1=Demonstrate adherence to instructed precautions during ADL tasks.


2=Patient will verbalize/demonstrate understanding of assistive 

devices/modifications for ADL.


3=Patient will improve strength/tolerance for activity to enable patient to 

perform ADL's.











ASMITA PASTRANA PT                Oct 24, 2022 14:44

## 2022-10-24 NOTE — DISCHARGE SUMMARY
Diagnosis/Chief Complaint


Date of Admission


Oct 20, 2022 at 10:27


Date of Discharge





Discharge Date:  Oct 24, 2022


Discharge Diagnosis


Assessment:


Right hip replacement by Dr. Brown uncomplicated


Slow recovery


Chronic debility


Chronic pain syndrome


Narcotic and benzodiazepine dependence


Severe scoliosis in need of extensive surgery


Fibromyalgia


Severe hypokalemia





Plan:


Supportive care


Monitor closely


Pain control but minimize medication


Aggressive PT and OT


Monitor potassium





10/21/2022:


Minimize pain meds


Replace potassium





10/22/2022:


Supplemental potassium





10/23/2022:


Supportive care





(1) Generalized weakness


Status:  Acute


(2) S/P hip replacement





Discharge Summary


Discharge Physical Examination


Allergies:  


Coded Allergies:  


     Penicillins (Verified  Allergy, Unknown, 12/19/19)


     doxycycline (Verified  Allergy, Unknown, RASH, HIVES, ITCHING, 7/22/21)


     escitalopram (Verified  Allergy, Unknown, 10/20/22)


   MAKES HER HAIR FALL OUT


     morphine (Verified  Allergy, Unknown, 10/20/22)


   pt states that she has broke out in a rash when she


   previously took Morphine


Vitals & I&Os





Vital Signs








  Date Time  Temp Pulse Resp B/P (MAP) Pulse Ox O2 Delivery O2 Flow Rate FiO2


 


10/24/22 16:20 36.6 92 16 110/75 95 Room Air  








General Appearance:  Alert, Oriented X3, Cooperative


Respiratory:  Clear to Auscultation


Cardiovascular:  Regular Rate


Psych/Mental Status:  Mental Status NL





Hospital Course


Was the Problem List Reviewed?:  Yes


Short hospital course after she was admitted from [place] after hip replacement 

and slow recovery. Overmedication was an issue prior to admit and family and PCP

made it clear to limit pain meds so that was maintained which caused multiple 

complaints from the patient. She takes an enormous amount of controlled meds at 

home so she was DC in improved condition after she was able to regain function 

to live independently.


Labs (last 24 hrs)


Laboratory Tests


10/21/22 05:26: 


White Blood Count 8.0, Red Blood Count 3.18L, Hemoglobin 10.9L, Hematocrit 32L, 

Mean Corpuscular Volume 101H, Mean Corpuscular Hemoglobin 34, Mean Corpuscular 

Hemoglobin Concent 34, Red Cell Distribution Width 12.5, Platelet Count 256, 

Mean Platelet Volume 9.5, Immature Granulocyte % (Auto) 1, Neutrophils (%) 

(Auto) 68, Lymphocytes (%) (Auto) 23, Monocytes (%) (Auto) 5, Eosinophils (%) 

(Auto) 3, Basophils (%) (Auto) 1, Neutrophils # (Auto) 5.4, Lymphocytes # (Auto)

1.8, Monocytes # (Auto) 0.4, Eosinophils # (Auto) 0.3, Basophils # (Auto) 0.1, 

Immature Granulocyte # (Auto) 0.0, Sodium Level 142, Potassium Level 3.0L, 

Chloride Level 113H, Carbon Dioxide Level 21, Anion Gap 8, Blood Urea Nitrogen 

8, Creatinine 0.73, Estimat Glomerular Filtration Rate 90, BUN/Creatinine Ratio 

11, Glucose Level 83, Calcium Level 8.4L, Corrected Calcium 9.2, Magnesium Level

2.1, Iron Level 24L, Total Bilirubin 0.4, Aspartate Amino Transf (AST/SGOT) 32, 

Alanine Aminotransferase (ALT/SGPT) 13, Alkaline Phosphatase 83, Total Protein 

5.6L, Albumin 3.0L, Vitamin B12 Level 507


10/24/22 07:03: 


White Blood Count 7.3, Red Blood Count 3.21L, Hemoglobin 10.7L, Hematocrit 33L, 

Mean Corpuscular Volume 103H, Mean Corpuscular Hemoglobin 33, Mean Corpuscular 

Hemoglobin Concent 32, Red Cell Distribution Width 12.5, Platelet Count 397, 

Mean Platelet Volume 9.1, Immature Granulocyte % (Auto) 1, Neutrophils (%) 

(Auto) 61, Lymphocytes (%) (Auto) 25, Monocytes (%) (Auto) 5, Eosinophils (%) 

(Auto) 7, Basophils (%) (Auto) 1, Neutrophils # (Auto) 4.4, Lymphocytes # (Auto)

1.8, Monocytes # (Auto) 0.4, Eosinophils # (Auto) 0.5H, Basophils # (Auto) 0.1, 

Immature Granulocyte # (Auto) 0.1, Sodium Level 140, Potassium Level 4.4, Chlo

ride Level 115H, Carbon Dioxide Level 17L, Anion Gap 8, Blood Urea Nitrogen 11, 

Creatinine 0.83, Estimat Glomerular Filtration Rate 77, BUN/Creatinine Ratio 13,

Glucose Level 92, Calcium Level 8.7, Corrected Calcium 9.3, Total Bilirubin 0.5,

Aspartate Amino Transf (AST/SGOT) 22, Alanine Aminotransferase (ALT/SGPT) 12, 

Alkaline Phosphatase 77, Total Protein 6.0L, Albumin 3.2





Pending Labs


Laboratory Tests


10/21/22 05:26: 


White Blood Count 8.0, Red Blood Count 3.18, Hemoglobin 10.9, Hematocrit 32, 

Mean Corpuscular Volume 101, Mean Corpuscular Hemoglobin 34, Mean Corpuscular 

Hemoglobin Concent 34, Red Cell Distribution Width 12.5, Platelet Count 256, 

Mean Platelet Volume 9.5, Immature Granulocyte % (Auto) 1, Neutrophils (%) 

(Auto) 68, Lymphocytes (%) (Auto) 23, Monocytes (%) (Auto) 5, Eosinophils (%) 

(Auto) 3, Basophils (%) (Auto) 1, Neutrophils # (Auto) 5.4, Lymphocytes # (Auto)

1.8, Monocytes # (Auto) 0.4, Eosinophils # (Auto) 0.3, Basophils # (Auto) 0.1, 

Immature Granulocyte # (Auto) 0.0, Sodium Level 142, Potassium Level 3.0, 

Chloride Level 113, Carbon Dioxide Level 21, Anion Gap 8, Blood Urea Nitrogen 8,

Creatinine 0.73, Estimat Glomerular Filtration Rate 90, BUN/Creatinine Ratio 11,

Glucose Level 83, Calcium Level 8.4, Corrected Calcium 9.2, Magnesium Level 2.1,

Iron Level 24, Total Bilirubin 0.4, Aspartate Amino Transf (AST/SGOT) 32, 

Alanine Aminotransferase (ALT/SGPT) 13, Alkaline Phosphatase 83, Total Protein 

5.6, Albumin 3.0, Vitamin B12 Level 507


10/24/22 07:03: 


White Blood Count 7.3, Red Blood Count 3.21, Hemoglobin 10.7, Hematocrit 33, 

Mean Corpuscular Volume 103, Mean Corpuscular Hemoglobin 33, Mean Corpuscular 

Hemoglobin Concent 32, Red Cell Distribution Width 12.5, Platelet Count 397, 

Mean Platelet Volume 9.1, Immature Granulocyte % (Auto) 1, Neutrophils (%) (

Auto) 61, Lymphocytes (%) (Auto) 25, Monocytes (%) (Auto) 5, Eosinophils (%) 

(Auto) 7, Basophils (%) (Auto) 1, Neutrophils # (Auto) 4.4, Lymphocytes # (Auto)

1.8, Monocytes # (Auto) 0.4, Eosinophils # (Auto) 0.5, Basophils # (Auto) 0.1, 

Immature Granulocyte # (Auto) 0.1, Sodium Level 140, Potassium Level 4.4, 

Chloride Level 115, Carbon Dioxide Level 17, Anion Gap 8, Blood Urea Nitrogen 

11, Creatinine 0.83, Estimat Glomerular Filtration Rate 77, BUN/Creatinine Ratio

13, Glucose Level 92, Calcium Level 8.7, Corrected Calcium 9.3, Total Bilirubin 

0.5, Aspartate Amino Transf (AST/SGOT) 22, Alanine Aminotransferase (ALT/SGPT) 

12, Alkaline Phosphatase 77, Total Protein 6.0, Albumin 3.2








Discharge


Home Medications:





Active Scripts


Active


Oxyir Tablet (Oxycodone HCl) 5 Mg Tab 5 Mg PO BID PRN


Reported


Metronidazole 0.75 % Cream..g. 1 Applic TP DAILY PRN


Clindamycin Phosphate (Clindamycin Phos) 1 % Gel 1 Applic TP BID PRN


Hydroxyzine HCl 50 Mg Tablet 50 Mg PO TID PRN


Vitamin D3 (Cholecalciferol (Vitamin D3)) 50 Mcg (2000 Unit) Tablet 50 Mcg PO 

DAILY


Topiramate 100 Mg Tablet 100 Mg PO BID


Sumatriptan Succinate 50 Mg Tablet 50 Mg PO UD PRN


Esomeprazole Magnesium 40 Mg Capsule.dr 40 Mg PO DAILY


Docusate Sodium 100 Mg Capsule 100 Mg PO HS


Vitamin B Complex 1 Each Tablet 1 Each PO DAILY


Promethazine Tablet (Promethazine HCl) 25 Mg Tablet 25 Mg PO Q6H PRN


Ambien (Zolpidem Tartrate) 10 Mg Tablet 10 Mg PO HS PRN


Trazodone HCl 50 Mg Tablet 50 Mg PO HS


Montelukast Sodium 10 Mg Tablet 10 Mg PO DAILY


Linzess (Linaclotide) 290 Mcg Capsule 290 Mcg PO DAILY BEFORE BREAKFAST


Levocetirizine Dihydrochloride 5 Mg Tablet 5 Mg PO 1800


Clonazepam 0.5 Mg Tablet 0.5 Mg PO TID PRN


Gabapentin 600 Mg Tablet 600 Mg PO QID


Atorvastatin Calcium 40 Mg Tablet 40 Mg PO DAILY





Instructions to patient/family


Please see electronic discharge instructions given to patient.





Diagnosis/Problems


Diagnosis/Problems





(1) Generalized weakness


Status:  Acute


(2) S/P hip replacement











SHAGGY DIXON DO                Oct 24, 2022 10:17

## 2022-10-24 NOTE — OCCUPATIONAL THER DAILY NOTE
OT Current Status-Daily Note


Subjective


Pt alert, lying in bed.  Nrsg in room.  Pt states that she wants to go home 

today then amended that she is going home today.  SW is working on discharge and

physician is okay with discharge.





Mental Status/Objective


Patient Orientation:  Person, Place, Time, Situation


Attachments:  Drains (wound vac)





ADL-Treatment


Pt unable to take shower due wound vac.  Pt completed sponge bath sitting on 

toilet.  Pt reached all areas except lower legs and feet due to hip precautions,

pt declined to use AE.  Per clinical judgment, pt able to complete toileting 

independently using FWW and grabbar.  Pt stood at sink, to complete oral care 

independently.  After set up, pt able to don/doff upper body clothing by self.  

Pt declines to use AE for lower body dressing, pt reports that she understands 

use of equipment and does not need further education.  Assist to thread lower 

body clothing over hips then pt able to complete rest of sequence by self.  

Assist to don/doff socks.  Pt states that she has walk-in shower at home and 

that [facility] will assist with showering. Per clinical judgment, pt able to complete 

eating independently.


Therapy Code Descriptions/Definitions 





Functional [place] Measure:


0=Not Assessed/NA        4=Minimal Assistance


1=Total Assistance        5=Supervision or Setup


2=Maximal Assistance  6=Modified [place]


3=Moderate Assistance 7=Complete IndependenceSCALE: Activities may be completed 

with or without assistive devices.





6-Indepedent-patient completes the activity by him/herself with no assistance 

from a helper.


5-Set-up or Clean-up Assistance-helper sets up or cleans up; patient completes 

activity. [place] assists only prior to or  


    following the activity.


4-Supervision or Touching Assistance-helper provides verbal cues and/or 

touching/steadying and/or contact guard assistance as patient completes 

activity. Assistance may be provided   


    throughout the activity or intermittently.


3-Partial/Moderate Assistance-helper does LESS THAN HALF the effort. [place] 

lifts, holds or supports trunk or limbs, but provides less than half the effort.


2-Substantial/Maximal Assistance-helper does MORE THAN HALF the effort. [place] 

lifts or holds trunk or limbs and provides more than half the effort.


2-Hplleevmm-yiwnyv does ALL the effort. Patient does none of the effort to 

complete the activity. Or, the assistance of 2 or more helpers is required for 

the patient to complete the  


    activity.


If activity was not attempted, code reason:


7-Patient Refused.


9-Not Applicable-not attempted and the patient did not perform the activity 

before the current illness, exacerbation or injury.


10-Not Attempted due to Environmental Limitations-(lack of equipment, weather 

restraints, etc.).


88-Not Attempted due to Medical Conditions or Safety Concerns.


Eating (QC):  6


Oral Hygiene (QC):  6


Bathing Location:  L Arm, R Arm, L Upper Leg, R Upper Leg, Chest, Abdomen, 

Buttocks, Perineal Area


Shower/Bathe Self (QC):  3


Upper Body Dressing (QC):  5


Lower Body Dressing (QC):  3


On/Off Footwear:  2


Toileting Hygiene (QC):  6


Toilet Transfer (QC):  6





BIMS CAM


BIMS


Expression of Ideas and Wants:  Without Difficulty


Understanding Verbal Content:  Understands


Brief Interview/Mental Status:  Yes


IRF KIM BIMS:  








IRF KIM BIMS Response (Comments) Value


 


Repitition of Three Words Three 3


 


Recalls Socks Yes, No Cue Required 2


 


Recalls Blue Yes, No Cue Required 2


 


Recalls Bed Yes, No Cue Required 2


 


Year Correct 3


 


Month Accurate Within 5 Days 2


 


Day Correct 1


 


Total  15








Patient Normally Able to Recal:  Current Session


Should Staff Asses. Mental St.:  No


Notes:  


15/15





OT Short Term Goals


Short Term Goals


Time Frame:  Oct 31, 2022


Eatin


Oral hygiene:  4


Toileting hygiene:  3


Shower/bathe self:  3


Upper body dressin


Lower body dressin


Putting on/taking off footwear:  3





OT Long Term Goals


Long Term Goals


Time Frame:  Nov 10, 2022


Acute change in mental status:  0


Inattention:  0


Disorganized thinkin


Altered level of consciousness:  0


Eating (QC):  6 (met)


Oral Hygiene (QC):  5 (met)


Toileting Hygiene (QC):  6 (met)


Shower/Bathe Self (QC):  5 (not met)


Upper Body Dressing (QC):  6 (not met)


Lower Body Dressing (QC):  5 (not met)


On/Off Footwear (QC):  6 (not met)


Additional Goals:  1-Demonstrate ADL Tasks, 2-Verbalize Understanding, 3-

ImproveStrength/Adal


1=Demonstrate adherence to instructed precautions during ADL tasks.


2=Patient will verbalize/demonstrate understanding of assistive 

devices/modifications for ADL.


3=Patient will improve strength/tolerance for activity to enable patient to 

perform ADL's.





OT Education/Plan


Problem List/Assessment


Assessment:  Decreased Activ Tolerance, Impaired Self-Care Skills





Discharge Recommendations


Plan/Recommendations:  Continue POC





Treatment Plan/Plan of Care


Patient would benefit from OT for education, treatment and training to promote 

independence in ADL's, mobility, safety and/or upper extremity function for 

ADL's.


Plan of Care:  ADL Retraining, Caregiver Training, Functional Mobility, Group 

Exercise/Act as Ind, UE Funct Exercise/Act


Treatment Duration:  Nov 10, 2022


Frequency:  At least 5 of 7 days/Wk (IRF)


Estimated Hrs Per Day:  1.5 hours per day (60-90 min/day)


Agreement:  Yes


Rehab Potential:  Guarded





Time/GCodes


Start Time:  09:30


Stop Time:  10:30


Total Time Billed (hr/min):  60


Billed Treatment Time


1 visit-ADL 4 (60 min)











LITO HONG               Oct 24, 2022 10:41

## 2022-10-24 NOTE — PM&R PROGRESS NOTE
Subjective


HPI/CC On Admission


Date Seen by Provider:  Oct 24, 2022


Time Seen by Provider:  09:00


Subjective/Events-last exam


10/24/2022:








10/23/2022:


Patient about the same


Complaints about chronic pain


Reports she is getting headaches and migraines due to not enough pain medicine 

and patient appears to be in no distress at all











10/22/2022:


Patient doing pretty well


Walking pretty well


Voiding well


Loose stools yesterday


Pain is still an issue








10/21/2022:


Patient medically stable


Replacing potassium


All she wants to talk about is her pain medication and the fact she takes more 

at home than she does here


Family and other doctors who have treated her all want to minimize pain 

medication and I informed her we will do that


Bowels are moving


Checked meds and labs





Review of Systems


General:  Fatigue, Malaise


Musculoskeletal:  neck pain, shoulder pain, arm pain, back pain, hand pain, leg 

pain





Objective


Exam


Vital Signs





Vital Signs








  Date Time  Temp Pulse Resp B/P (MAP) Pulse Ox O2 Delivery O2 Flow Rate FiO2


 


10/24/22 09:00      Room Air  


 


10/24/22 07:43 36.8 87 16 101/51 (68) 91   





Capillary Refill :


General Appearance:  No Apparent Distress, WD/WN, Chronically ill, Thin, Other 

([place])


HEENT:  PERRL/EOMI, Normal ENT Inspection, Pharynx Normal


Neck:  Full Range of Motion, Normal Inspection, Non Tender, Supple, Carotid 

Bruit


Respiratory:  Chest Non Tender, Lungs Clear, Normal Breath Sounds, No Accessory 

Muscle Use, No Respiratory Distress


Cardiovascular:  Regular Rate, Rhythm, No Edema, No Gallop, No JVD, No Murmur, 

Normal Peripheral Pulses


Gastrointestinal:  Normal Bowel Sounds, No Organomegaly, No Pulsatile Mass, Non 

Tender, Soft


Back:  Normal Inspection, No CVA Tenderness, No Vertebral Tenderness


Extremity:  Normal Capillary Refill, Normal Inspection, Normal Range of Motion 

(Except for right leg), Non Tender, No Calf Tenderness, No Pedal Edema


Neurologic/Psychiatric:  Alert, Oriented x3, Normal Mood/Affect, CNs II-XII Norm

as Tested, Abnormal Gait, Motor Weakness (Right leg weakness)


Skin:  Normal Color, Warm/Dry


Lymphatic:  No Adenopathy





Results/Procedures


Lab


Laboratory Tests


10/24/22 07:03








Patient resulted labs reviewed.





FIM


Transfers


Therapy Code Descriptions/Definitions 





Functional [place] Measure:


0=Not Assessed/NA        4=Minimal Assistance


1=Total Assistance        5=Supervision or Setup


2=Maximal Assistance  6=Modified [place]


3=Moderate Assistance 7=Complete IndependenceSCALE: Activities may be completed 

with or without assistive devices.





6-Indepedent-patient completes the activity by him/herself with no assistance 

from a helper.


5-Set-up or Clean-up Assistance-helper sets up or cleans up; patient completes 

activity. [place] assists only prior to or  


    following the activity.


4-Supervision or Touching Assistance-helper provides verbal cues and/or 

touching/steadying and/or contact guard assistance as patient completes 

activity. Assistance may be provided   


    throughout the activity or intermittently.


3-Partial/Moderate Assistance-helper does LESS THAN HALF the effort. [place] 

lifts, holds or supports trunk or limbs, but provides less than half the effort.


2-Substantial/Maximal Assistance-helper does MORE THAN HALF the effort. [place] 

lifts or holds trunk or limbs and provides more than half the effort.


7-Yovuvypxk-bevwry does ALL the effort. Patient does none of the effort to 

complete the activity. Or, the assistance of 2 or more helpers is required for 

the patient to complete the  


    activity.


If activity was not attempted, code reason:


7-Patient Refused.


9-Not Applicable-not attempted and the patient did not perform the activity 

before the current illness, exacerbation or injury.


10-Not Attempted due to Environmental Limitations-(lack of equipment, weather 

restraints, etc.).


88-Not Attempted due to Medical Conditions or Safety Concerns.


Roll Left to Right (QC):  3


Sit to Lying (QC):  3


Sit to Stand (QC):  4


Chair/Bed-to-Chair Xfer(QC):  4


Car Transfer (QC):  2





Gait Training


Does the Patient Walk?:  Yes


Distance:  20', 10'


Walk 10 feet (QC):  4


Walk 50 ft with 2 Turns(QC):  4


Walk 150 ft (QC):  88


Walking 10ft/uneven surface-QC:  88


Gait Persons Needed:  1


Gait Assistive Device:  FWW





Wheelchair Training


Does the Pt Use a Wheelchair?:  Yes


Distance:  20


Wheel 50 ft with 2 turns (QC):  3


Wheel 150 ft (QC):  88


Type of Wheelchair:  Manual





Stair Training


 Stair Training: Handrails/:  uses walker


#of Steps:  1


1 Step (curb) (QC):  3


4 Steps (QC):  88


12 Steps (QC):  88


Stairs:  Pattern:  Step to





Balance


Picking up an Object (QC):  4 (CGA using a reacher)





ADL-Treatment


Eating (QC):  6


Oral Hygiene (QC):  6


Bathing Location:  L Arm, R Arm, L Upper Leg, R Upper Leg, Chest, Abdomen, 

Buttocks, Perineal Area


Shower/Bathe Self (QC):  3 (Min A after set up for sponge bath, pt completed all

areas except lower legs and feet due to time constraints.  Will give pt LH 

sponge.)


Upper Body Dressing (QC):  5


Lower Body Dressing (QC):  3


On/Off Footwear (QC):  1


Toileting Hygiene (QC):  4


Toilet Transfer (QC):  4





Assessment/Plan


Assessment and Plan


Assess & Plan/Chief Complaint


Assessment:


Right hip replacement by Dr. Brown uncomplicated


Slow recovery


Chronic debility


Chronic pain syndrome


Narcotic and benzodiazepine dependence


Severe scoliosis in need of extensive surgery


Fibromyalgia


Severe hypokalemia





Plan:


Supportive care


Monitor closely


Pain control but minimize medication


Aggressive PT and OT


Monitor potassium





10/21/2022:


Minimize pain meds


Replace potassium





10/22/2022:


Supplemental potassium





10/23/2022:


Supportive care





10/24/2022:





(1) Generalized weakness


Status:  Acute


(2) S/P hip replacement











SHAGGY DIXON DO                Oct 24, 2022 06:50

## 2022-10-24 NOTE — D/C HH FACE TO FACE ORDER
D/C HH Face to Face Orders


Reconcile Patient Problems


Problems Reviewed?:  Yes





Instructions for Patient


HH


Patient Instructions/FollowUp:  


PCP 1 week


Physician to follow Patient:  PCP


Discharge Diet for Home:  No Restrictions


Patient Problems:  


Hip replacement





Patient Data-Allergies,Ht & Wt


Patient Allergies:  


Coded Allergies:  


     Penicillins (Verified  Allergy, Unknown, 12/19/19)


     doxycycline (Verified  Allergy, Unknown, RASH, HIVES, ITCHING, 7/22/21)


     escitalopram (Verified  Allergy, Unknown, 10/20/22)


   MAKES HER HAIR FALL OUT


     morphine (Verified  Allergy, Unknown, 10/20/22)


   pt states that she has broke out in a rash when she


   previously took Morphine





Home Health Need/Face to Face


Date of Face to Face:  Oct 24, 2022


Clinical Findings:  Muscle weakness, Pain with ambulation


I have seen Pt face-to-face:  Yes


Discharged To:  Home


Diagnosis/Conditions:  


Debility


Patient is Homebound due to:  Muscle weakness


Homebound Status


   Due to the above stated illness, injury or surgical procedure (medical 

condition or diagnosis) and associated clinical findings, the patient is 

homebound because of his/her inability to leave home except with aid of a 

supportive device and/or person AND leaving the home requires a considerable and

taxing effort or is medically contraindicated.


Pt req the following assistanc:  Walker





Home Health Nursing Orders


Home Health Services Order:  Nursing Services, Occupational Ther-Evaluate & 

Treat, Physical Therapy-Evaluate & Treat


Certify Stmt


I certify that this patient is under my care and that I, a nurse practitioner or

a physician; a assistant working with me, had a face to face encounter that -

meets the physician face to face encounter requirements with this patient as 

dated.











SHAGGY DIXON DO                Oct 24, 2022 10:17

## 2022-10-24 NOTE — PHYSICAL THERAPY DAILY NOTE
PT Daily Note-Current


Subjective


Pt sitting up in recliner upon arrival.  Pt agrees to PT but reports wanting to 

d/c ASAP due to panic attacks pt is having and pt feels she can take of self at 

home.





Pain





   Location:  Right


   Location Body Site:  Hip


Section J - Health Conditions


1. Rarely or not at all


2. Occasionally


3. Frequently


4. Almost constantly


8. Unable to answer


Pain Effect on Sleep:  8


Pain Interference with Therapy:  8


Pain Interference w/Day-to-Day:  8





Mental Status


Patient Orientation:  Person, Confused, Place





Transfers


SCALE: Activities may be completed with or without assistive devices.





6-Indepedent-patient completes the activity by him/herself with no assistance 

from a helper.


5-Set-up or Clean-up Assistance-helper sets up or cleans up; patient completes 

activity. [place] assists only prior to or  


    following the activity.


4-Supervision or Touching Assistance-helper provides verbal cues and/or 

touching/steadying and/or contact guard assistance as patient completes 

activity. Assistance may be provided   


    throughout the activity or intermittently.


3-Partial/Moderate Assistance-helper does LESS THAN HALF the effort. [place] 

lifts, holds or supports trunk or limbs, but provides less than half the effort.


2-Substantial/Maximal Assistance-helper does MORE THAN HALF the effort. [place] 

lifts or holds trunk or limbs and provides more than half the effort.


6-Naxzrtram-nbbjlm does ALL the effort. Patient does none of the effort to 

complete the activity. Or, the assistance of 2 or more helpers is required for 

the patient to complete the  


    activity.


If activity was not attempted, code reason:


7-Patient Refused.


9-Not Applicable-not attempted and the patient did not perform the activity 

before the current illness, exacerbation or injury.


10-Not Attempted due to Environmental Limitations-(lack of equipment, weather 

restraints, etc.).


88-Not Attempted due to Medical Conditions or Safety Concerns.


Roll Left & Right (QC):  6


Sit to Lying (QC):  5


Lying to Sitting/Side of Bed(Q:  6


Sit to Stand (QC):  6


Chair/Bed-to-Chair Xfer(QC):  6


Toilet Transfer (QC):  6


Car Transfer (QC):  5





Weight Bearing


Right Lower Extremity:  Right


Weight Bearing/Tolerated


Left Lower Extremity:  Left


Full Weight Bearing





Gait Training


Does the Patient Walk?:  Yes


Distance:  500'


Walk 10 feet (QC):  6


Walk 50 ft with 2 Turns(QC):  6


Walk 150 ft (QC):  6


Walking 10ft/uneven surface-QC:  6


Gait Assistive Device:  FWW





Wheelchair Training


Does the Pt Use a Wheelchair?:  No





Stair Training


1 Step (curb) (QC):  88


4 Steps (QC):  88


12 Steps (QC):  88


Pt reports too much hip pain to attempt and won't have any to amb. at home.





Balance


Picking up an Object (QC):  88


Special Test Comments


Hip Precautions don't allow for this and reacher suggested.  Pt doesn't want to 

attempt.





Exercises


Seated Therapy Exercises:  Ankle pumps, Long arc quads, Hip flexion, Glut set


Seated Reps:  15


Standing:  Hip Abduction, Hamstring curls, Heel/toe raises, 3 way Ex=Flex, Abd, 

Ext (10 reps), Marching, Weight shifts


Standing Reps:  15





Treatments


TF to standing, declines need for BR.  Amb in hallway, completes QC scoring 

items listed above as well Seated & Standing EX at //bars.  Pt returns to room 

to rest EOB and take morning meds.  All needs met.





Assessment


Current Status:  Fair Progress


Pt continues to be hyperfocused on pain meds and wants to leave ASAP.





PT Long Term Goals


Long Term Goals


PT Long Term Goals Time Frame:  Nov 11, 2022


Roll Left & Right (QC):  6


Sit to Lying (QC):  6


Lying-Sitting on Side/Bed(QC):  6


Sit to Stand (QC):  6


Chair/Bed-to-Chair Xfer(QC):  6


Toilet Transfer (QC):  6


Car Transfer (QC):  6


Does the Patient Walk:  Yes


Walk 10 feet (QC):  6


Walk 50ft with 2 Turns (QC):  4


Walk 150 ft (QC):  4


Walking 10ft on Uneven Surface:  6


1 Step (curb) (QC):  4


4 Steps (QC):  4


12 Steps (QC):  4


Picking up an Object (QC):  4


Does the Pt use WC or Scooter?:  Yes


Wheel 50 feet with 2 turns (QC:  6


Wheel 150 feet:  6





PT Plan


Problem List


Problem List:  Safety





Treatment/Plan


Treatment Plan:  Continue Plan of Care


Treatment Plan:  Bed Mobility, Education, Functional Activity Adal, Functional 

Strength, Group Therapy, Gait, Safety, Therapeutic Exercise, Transfers


Treatment Duration:  Dec 31, 2022


Frequency:  At least 5 of 7 days/Wk (IRF)


Estimated Hrs Per Day:  1.5 hours per day


Patient and/or Family Agrees t:  Yes





Safety Risks/Education


Patient Education:  Reviewed Precautions, Correct Positioning, Safety Issues


Teaching Recipient:  Patient


Teaching Methods:  Discussion


Response to Teaching:  Verbalize Understanding





Time/GCodes


Time In:  800


Time Out:  930


Total Billed Treatment Time:  90


Total Billed Treatment


1, GT x2 (30m), EX x2 (30m) & FA x2 (30m)











CLEVE HOOKER PTA              Oct 24, 2022 10:55

## 2022-10-25 NOTE — THERAPY TEAM DISCHARGE SUMMARY
Therapy Discharge Summary


Discharge Recommendations


Date of Discharge


Oct 24, 2022 at 16:29


Therapy D/C Recommendations:  Bath Aide, Occupational Therapy Home Care, 

Homemaker Support, Scheduled Assistance





Physical Therapy


Roll Left to Right (QC):  6


Sit to Lying (QC):  5


Lying to Sitting/Side of Bed(Q:  6


Sit to Stand (QC):  6


Chair/Bed-to-Chair Xfer(QC):  6


Toilet Transfer (QC):  5


Car Transfer (QC):  5


Does the Patient Walk:  Yes


Mode of Locomotion:  Walk


Anticipated Mode of Locomotion:  Walk


Walk 10 feet (QC):  6


Walk 50 ft with 2 Turns(QC):  6


Walk 150 ft (QC):  6


Walking 10ft on uneven surface:  6


Distance:  500


Gait Assistive Device:  FWW


Does the Pt Use a Wheelchair:  No


Wheelchair Distance:  20


Wheel 50 ft with 2 turns (QC):  3


Wheel 150 ft (QC):  88


Type of Wheelchair:  Manual


#of Steps:  1


1 Step (curb) (QC):  88


4 Steps (QC):  88


12 Steps (QC):  88


Balance Sitting Static:  Fair


Balance Sitting Dynamic:  Fair


Balance-Standing Static:  Poor


Picking up an Object (QC):  88





Occupational Therapy


Pt came to ARU after recent hip surgery. At the time of eval, pt was dependent 

for footwear, min assist for showering, and lower body dressing, supervision for

eating, toilet hygiene and toilet transfer, set up for upper body dressing, and 

independent for oral hygiene. During her stay, OT worked on compensatory 

strategies, strengthening, safety, AE, balance, and ADLs. Pt made fair progress 

but did not meet all of her long term goals due to refusal to utilize adaptive 

equipment or modifications/suggestions made by therapist. She has now d/c from 

this facility and will be d/c from skilled OT at this time.


Decreased Activ Tolerance, Impaired Self-Care Skills


Eating (QC):  6


Oral Hygiene (QC):  6


Shower/Bathe Self (QC):  3


Upper Body Dressing (QC):  5


Lower Body Dressing (QC):  3


On/Off Footwear (QC):  2


Toileting Hygiene (QC):  6





PT Long Term Goals


Long Term Goals


PT Long Term Goals Time Frame:  2022


Roll Left to Right (QC):  6


Sit to Lying (QC):  6


Lying-Sitting on Side/Bed(QC):  6


Sit to Stand (QC):  6


Chair/Bed-to-Chair Xfer(QC):  6


Toilet/Commode Transfer (QC):  6


Car Transfer (QC):  6


Does the Patient Walk:  Yes


Walk 10 feet (QC):  6


Walk 10ft-Uneven Surface(QC):  6


Walk 50ft with 2 Turns (QC):  4


Walk 150 ft (QC):  4


Does the Pt use WC or Scooter?:  Yes


Wheel 50 feet with 2 turns (QC:  6


Wheel 150 feet:  6


1 Step (curb) (QC):  4


4 Steps (QC):  4


12 Steps (QC):  4


Picking up an Object (QC):  4





OT Long Term Goals


Long Term Goals


Time Frame:  Nov 10, 2022


Acute change in mental status:  0


Inattention:  0


Disorganized thinkin


Altered level of consciousness:  0


Eating (QC):  6 (met)


Oral Hygiene (QC):  5 (met)


Toileting Hygiene (QC):  6 (met)


Shower/Bathe Self (QC):  5 (not met)


Upper Body Dressing (QC):  6 (not met)


Lower Body Dressing (QC):  5 (not met)


On/Off Footwear (QC):  6 (not met)


Additional Goals:  1-Demonstrate ADL Tasks, 2-Verbalize Understanding, 3-

ImproveStrength/Adal


1=Demonstrate adherence to instructed precautions during ADL tasks.


2=Patient will verbalize/demonstrate understanding of assistive 

devices/modifications for ADL.


3=Patient will improve strength/tolerance for activity to enable patient to 

perform ADL's.











Marivel Hurley OT                Oct 25, 2022 09:43